# Patient Record
Sex: FEMALE | Race: WHITE | NOT HISPANIC OR LATINO | Employment: OTHER | URBAN - METROPOLITAN AREA
[De-identification: names, ages, dates, MRNs, and addresses within clinical notes are randomized per-mention and may not be internally consistent; named-entity substitution may affect disease eponyms.]

---

## 2022-10-25 ENCOUNTER — OFFICE VISIT (OUTPATIENT)
Dept: INTERNAL MEDICINE | Facility: CLINIC | Age: 77
End: 2022-10-25

## 2022-10-25 VITALS
BODY MASS INDEX: 29.82 KG/M2 | HEIGHT: 65 IN | WEIGHT: 179 LBS | OXYGEN SATURATION: 96 % | SYSTOLIC BLOOD PRESSURE: 124 MMHG | DIASTOLIC BLOOD PRESSURE: 70 MMHG | TEMPERATURE: 96.8 F | HEART RATE: 93 BPM

## 2022-10-25 DIAGNOSIS — M48.062 SPINAL STENOSIS OF LUMBAR REGION WITH NEUROGENIC CLAUDICATION: ICD-10-CM

## 2022-10-25 DIAGNOSIS — I10 HYPERTENSION, UNSPECIFIED TYPE: ICD-10-CM

## 2022-10-25 DIAGNOSIS — E03.9 HYPOTHYROIDISM, UNSPECIFIED TYPE: ICD-10-CM

## 2022-10-25 DIAGNOSIS — G47.00 INSOMNIA, UNSPECIFIED TYPE: ICD-10-CM

## 2022-10-25 DIAGNOSIS — E78.5 HYPERLIPIDEMIA, UNSPECIFIED HYPERLIPIDEMIA TYPE: ICD-10-CM

## 2022-10-25 DIAGNOSIS — Z00.00 ANNUAL PHYSICAL EXAM: Primary | ICD-10-CM

## 2022-10-25 PROBLEM — M65.9 FLEXOR CARPI RADIALIS TENOSYNOVITIS: Status: ACTIVE | Noted: 2021-06-21

## 2022-10-25 PROBLEM — M65.939 FLEXOR CARPI RADIALIS TENOSYNOVITIS: Status: ACTIVE | Noted: 2021-06-21

## 2022-10-25 PROCEDURE — 99204 OFFICE O/P NEW MOD 45 MIN: CPT | Performed by: STUDENT IN AN ORGANIZED HEALTH CARE EDUCATION/TRAINING PROGRAM

## 2022-10-25 RX ORDER — CYCLOBENZAPRINE HCL 10 MG
TABLET ORAL
COMMUNITY
Start: 2022-10-01 | End: 2022-12-12

## 2022-10-25 RX ORDER — LOSARTAN POTASSIUM 50 MG/1
100 TABLET ORAL DAILY
COMMUNITY
Start: 2022-10-03 | End: 2023-01-12 | Stop reason: ALTCHOICE

## 2022-10-25 RX ORDER — GABAPENTIN 300 MG/1
CAPSULE ORAL
COMMUNITY
Start: 2022-10-04 | End: 2022-12-12

## 2022-10-25 RX ORDER — DONEPEZIL HYDROCHLORIDE 5 MG/1
TABLET, FILM COATED ORAL
COMMUNITY
Start: 2022-10-03 | End: 2022-12-12

## 2022-10-25 RX ORDER — MELOXICAM 15 MG/1
15 TABLET ORAL DAILY
COMMUNITY
Start: 2022-09-24 | End: 2023-01-18 | Stop reason: SDUPTHER

## 2022-10-25 RX ORDER — SIMVASTATIN 40 MG
40 TABLET ORAL NIGHTLY
COMMUNITY
Start: 2022-09-30 | End: 2023-03-02 | Stop reason: SDUPTHER

## 2022-10-25 RX ORDER — LEVOTHYROXINE SODIUM 0.05 MG/1
50 TABLET ORAL DAILY
COMMUNITY
Start: 2022-10-22 | End: 2023-03-02 | Stop reason: SDUPTHER

## 2022-10-25 RX ORDER — METFORMIN HYDROCHLORIDE 750 MG/1
750 TABLET, EXTENDED RELEASE ORAL
COMMUNITY
Start: 2022-09-29 | End: 2023-01-17 | Stop reason: SDUPTHER

## 2022-10-25 RX ORDER — PANTOPRAZOLE SODIUM 40 MG/1
40 TABLET, DELAYED RELEASE ORAL DAILY
COMMUNITY
Start: 2022-09-30 | End: 2023-03-02 | Stop reason: SDUPTHER

## 2022-10-25 NOTE — PROGRESS NOTES
Venkata Enamorado M.D.  Internal Medicine  [facility]  [address], [address]  [place] [address]  [phone]      Chief Complaint  Establish Care, spinal stenosis  (Starting to effect her balance /), and Insomnia (Having issues sleeping )    SUBJECTIVE    History of Present Illness    Sneha Cook is a 77 y.o. female who presents to the office today as a new patient to establish care.     She previously saw Dr. Blakely      Spinal stenosis-she had a fall last week. She has an appointment with neurology Dr. Perez on December 14th. She has tingling and numbnbess in toes and fore meet. No weakness. She has back pain radiating to feet. It makes it hard to move in the AM. Did PT for 2 months. Was on meloxiccam and gabapentin. She felt PT helped. This is interfering with sleep. Uses heating pad at night and ice during the day. Also taking tylenol PM. She wants to avoid surgery. She fell on bathroom floor and had bruises on her side. Her legs were numb when she woke up so she lost balance. She is concerned about upcoming trip to [place] because she puts head and feet up on bed. Back pain gets better throughout day. She has stiffness in the morning. Epidural in past helped somewhat. No excessive worry.     States she had an abnormal EMG.     She takes meloxicam twice day.     She has diabetes on metformin. She is trying to control with diet.     HTN-on losartan. Usually controlled but had reading of 174 at pain medine apppointment.     HLD-on simvastin.     Hypothyroidism-s/p throidectomy. No cancer history.     Insomnia-for three months. She can fall asleep but wakes up at 12 and cannot go back to sleep. She watches TV when she can't fall asleep.     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 Lives with her son and walks daily    Review of Systems    No Known Allergies     Outpatient Medications Marked as Taking for the 10/25/22 encounter (Office Visit) with Venkata Enamorado MD  "  Medication Sig Dispense Refill   • cyclobenzaprine (FLEXERIL) 10 MG tablet      • donepezil (ARICEPT) 5 MG tablet      • gabapentin (NEURONTIN) 300 MG capsule      • levothyroxine (SYNTHROID, LEVOTHROID) 50 MCG tablet      • losartan (COZAAR) 50 MG tablet      • meloxicam (MOBIC) 15 MG tablet      • metFORMIN ER (GLUCOPHAGE-XR) 750 MG 24 hr tablet      • pantoprazole (PROTONIX) 40 MG EC tablet      • simvastatin (ZOCOR) 40 MG tablet           Past Medical History:   Diagnosis Date   • Diabetes mellitus (HCC)    • Hypothyroidism      Past Surgical History:   Procedure Laterality Date   • ADENOIDECTOMY     •  SECTION       Family History   Problem Relation Age of Onset   • Diabetes Father    • Diabetes Son    • No Known Problems Paternal Uncle     reports that she quit smoking about 52 years ago. Her smoking use included cigarettes. She has never used smokeless tobacco. She reports that she does not currently use alcohol. She reports that she does not use drugs.    OBJECTIVE    Vital Signs:   /70   Pulse 93   Temp 96.8 °F (36 °C)   Ht 165.1 cm (65\")   Wt 81.2 kg (179 lb)   SpO2 96%   BMI 29.79 kg/m²     Physical Exam  Constitutional:       Appearance: Normal appearance. She is normal weight.   Cardiovascular:      Rate and Rhythm: Normal rate and regular rhythm.      Heart sounds: Normal heart sounds. No murmur heard.  Pulmonary:      Effort: Pulmonary effort is normal.      Breath sounds: Normal breath sounds.   Abdominal:      General: Abdomen is flat. There is no distension.      Palpations: Abdomen is soft.      Tenderness: There is no abdominal tenderness.   Musculoskeletal:      Lumbar back: No edema or bony tenderness. Decreased range of motion. Positive left straight leg raise test. Negative right straight leg raise test.   Skin:     General: Skin is warm and dry.   Neurological:      Mental Status: She is alert.      Deep Tendon Reflexes: Reflexes normal.   Psychiatric:         Mood and " Affect: Mood normal.         Behavior: Behavior normal.         Thought Content: Thought content normal.            The following data was reviewed by: Venkata Enamorado MD on 10/25/2022:                Data reviewed: Note from Dr. Blakely's office           IMPRESSION:   1. Transitional spinal anatomy and degenerative changes of the lumbar spine, detailed above. If a spinal procedure is to be performed, careful correlation for exact spinal level is recommended.     2. At L4-L5, retrolisthesis, severe degenerative disc disease, and severe facet arthropathy contribute to severe spinal stenosis, severe bilateral lateral recess stenosis, flattening of the L5 nerve roots, and right greater than left neural foraminal   narrowing     3. At L3-L4, retrolisthesis, severe degenerative disc disease, and facet arthropathy contribute to moderate spinal stenosis and moderate bilateral neural foraminal narrowing,     4. At L2-L3, retrolisthesis and mild to moderate degenerative disc disease contribute to mild to moderate central canal stenosis and moderate bilateral neural foraminal narrowing        ASSESSMENT & PLAN     Diagnoses and all orders for this visit:    1. Annual physical exam (Primary)  -     Ambulatory Referral to Physical Therapy  -     Hemoglobin A1c; Future  -     Comprehensive Metabolic Panel; Future  -     CBC & Differential; Future  -     Lipid Panel With LDL / HDL Ratio; Future  -     TSH Rfx On Abnormal To Free T4; Future    2. Spinal stenosis of lumbar region with neurogenic claudication  Comments:  We will follow-up after neurology appointment at [facility].  Patient to continue current management.  We will refer to PT as this helped in the past.    3. Hypertension, unspecified type  Comments:  Continue current management    4. Hypothyroidism, unspecified type  Comments:  Checking TSH    5. Hyperlipidemia, unspecified hyperlipidemia type  Comments:  Checking lipid panel    6. Insomnia, unspecified  type  Comments:  Discussed sleep hygiene today.  Discouraged patient from watching TV when she cannot sleep.          Health Maintenance Due   Topic Date Due   • DXA SCAN  Never done   • ANNUAL PHYSICAL  Never done   • TDAP/TD VACCINES (1 - Tdap) Never done   • ZOSTER VACCINE (1 of 2) Never done   • Pneumococcal Vaccine 65+ (1 - PCV) Never done   • COVID-19 Vaccine (4 - Booster for Pfizer series) 12/23/2021   • INFLUENZA VACCINE  Never done   • HEPATITIS C SCREENING  Never done   • LIPID PANEL  10/26/2022        Follow Up  Return in about 2 months (around 12/25/2022) for between December 14th and December 23rd and lab appointment at patients convenience. .    Patient/family had no further questions at this time and verbalized understanding of the plan discussed today.

## 2022-10-26 ENCOUNTER — PATIENT ROUNDING (BHMG ONLY) (OUTPATIENT)
Dept: INTERNAL MEDICINE | Facility: CLINIC | Age: 77
End: 2022-10-26

## 2022-10-26 PROBLEM — I10 HYPERTENSION: Status: ACTIVE | Noted: 2022-10-26

## 2022-10-26 PROBLEM — M48.062 SPINAL STENOSIS OF LUMBAR REGION WITH NEUROGENIC CLAUDICATION: Status: ACTIVE | Noted: 2022-10-26

## 2022-10-26 PROBLEM — G47.00 INSOMNIA: Status: ACTIVE | Noted: 2022-10-26

## 2022-10-26 PROBLEM — E03.9 HYPOTHYROIDISM: Status: ACTIVE | Noted: 2022-10-26

## 2022-10-26 PROBLEM — E78.5 HYPERLIPIDEMIA: Status: ACTIVE | Noted: 2022-10-26

## 2022-10-26 NOTE — PROGRESS NOTES
October 26, 2022    I am  with [facility]  [address] [address] [phone].      Information collected by Bree at check out    Tell me about your visit with us. What things went well?  Everyone was efficient and courteous.  Medical assistant was pleasant and took my blood pressure and went over my medications       We're always looking for ways to make our patients' experiences even better. Do you have recommendations on ways we may improve?  NO    Overall were you satisfied with your first visit to our practice? YES      I appreciate you taking the time to speak with me today. Is there anything else I can do for you?  NO      Thank you, and have a great day.

## 2022-12-12 ENCOUNTER — OFFICE VISIT (OUTPATIENT)
Dept: FAMILY MEDICINE CLINIC | Facility: CLINIC | Age: 77
End: 2022-12-12

## 2022-12-12 VITALS
TEMPERATURE: 97.7 F | OXYGEN SATURATION: 99 % | RESPIRATION RATE: 16 BRPM | SYSTOLIC BLOOD PRESSURE: 136 MMHG | HEIGHT: 64 IN | WEIGHT: 180 LBS | HEART RATE: 63 BPM | BODY MASS INDEX: 30.73 KG/M2 | DIASTOLIC BLOOD PRESSURE: 82 MMHG

## 2022-12-12 DIAGNOSIS — M48.062 SPINAL STENOSIS OF LUMBAR REGION WITH NEUROGENIC CLAUDICATION: ICD-10-CM

## 2022-12-12 DIAGNOSIS — E89.0 POSTOPERATIVE HYPOTHYROIDISM: Primary | ICD-10-CM

## 2022-12-12 DIAGNOSIS — I10 PRIMARY HYPERTENSION: ICD-10-CM

## 2022-12-12 DIAGNOSIS — E78.5 DYSLIPIDEMIA: ICD-10-CM

## 2022-12-12 PROCEDURE — 99214 OFFICE O/P EST MOD 30 MIN: CPT | Performed by: FAMILY MEDICINE

## 2022-12-12 RX ORDER — CHLORAL HYDRATE 500 MG
CAPSULE ORAL
COMMUNITY

## 2022-12-12 NOTE — PROGRESS NOTES
Subjective     Sneha Cook is a 77 y.o. female.     Chief Complaint   Patient presents with   • Hypertension   • Hyperlipidemia   • Hypothyroidism   • Establish Care   • Numbness     10 toes are numb pain wakes her up at night.   • Diabetes     Had A1c done less than 3 months ago it was 6.5 with Nena Blakely.       History of Present Illness      To establish care, discuss the followings ;    Chronic back pain with Spinal stenosis. She has tingling and numbnbess in toes . No weakness.   She has back pain radiating to feet.   Epidural in past helped somewhat.   Pain worse with movement   Walks every day without issue   All toes are numb and cold at night   Was on Kathy, stopped taking it due to S/E   Had PT , helps some , was on meloxiccam and gabapentin, no longer on kathy due no s/e .  She concerned about upcoming trip to [place].  She has an appointment with neurology Dr. Perez on December 14th.     HTN  -This is a chronic problem.   - on HD  -associated signs and symptoms: none  -medication compliance: on losartan,  taking as prescribed, No S/E reported from current Rx   -Denies  blurred vision, chest pain, neck pain, orthopnea or shortness of breath.       Lab Results   Component Value Date    BUN 15 07/16/2020    CREATININE 0.8 07/16/2020    BCR 18.8 07/16/2020    K 4.8 07/16/2020    CO2 30 07/16/2020    CALCIUM 9.5 07/16/2020    ALBUMIN 4.2 07/16/2020    LABIL2 1.6 07/16/2020    AST 27 07/16/2020    ALT 19 07/16/2020         Hypothyroidism s/p throidectomy  This is a chronic problem. Nothing aggravates the symptoms.   Doing well on Levothyr 50 mcg.      Lab Results   Component Value Date    TSH 0.654 07/16/2020     GERD;  well controlled with current Rx, no S/E reported.     DM type 2 ; On HD low in carbs, doing well on MTF  Not smoker   Dose not drinks alcohol  Lab Results   Component Value Date    HGBA1C 7.0 (H) 07/16/2020          Hyperlipidemia  This is a chronic problem. Last lipid tests were reviewed  . Had recent labs done 3 weeks ago   Eating healthy   Pertinent negatives include no chest pain or shortness of breath.   Current antihyperlipidemic treatment includes statins. No S/E reported    Risk factors for coronary artery disease include dyslipidemia.     Lab Results   Component Value Date    CHLPL 182 07/16/2020    TRIG 108 07/16/2020    HDL 83 07/16/2020    LDL 77 07/16/2020     Labs and notes from previous PCP has been reviewed     The following portions of the patient's history were reviewed and updated as appropriate: allergies, current medications, past family history, past medical history, past social history, past surgical history and problem list.        Review of Systems   Cardiovascular: Negative for chest pain, palpitations and leg swelling.   Endocrine: Negative for cold intolerance and heat intolerance.   Musculoskeletal: Positive for back pain. Negative for gait problem.       Vitals:    12/12/22 1558   BP: 136/82   Pulse: 63   Resp: 16   Temp: 97.7 °F (36.5 °C)   SpO2: 99%           12/12/22  1558   Weight: 81.6 kg (180 lb)         Body mass index is 30.9 kg/m².      Current Outpatient Medications   Medication Sig Dispense Refill   • levothyroxine (SYNTHROID, LEVOTHROID) 50 MCG tablet Take 50 mcg by mouth Daily.     • losartan (COZAAR) 50 MG tablet Take 100 mg by mouth Daily.     • meloxicam (MOBIC) 15 MG tablet Take 15 mg by mouth Daily.     • metFORMIN ER (GLUCOPHAGE-XR) 750 MG 24 hr tablet Take 750 mg by mouth Daily With Breakfast.     • Omega-3 Fatty Acids (fish oil) 1000 MG capsule capsule Take  by mouth Daily With Breakfast.     • pantoprazole (PROTONIX) 40 MG EC tablet Take 40 mg by mouth Daily.     • simvastatin (ZOCOR) 40 MG tablet Take 40 mg by mouth Every Night.       No current facility-administered medications for this visit.                Objective   Physical Exam  Vitals and nursing note reviewed.   Constitutional:       General: She is not in acute distress.     Appearance:  She is not ill-appearing, toxic-appearing or diaphoretic.   HENT:      Mouth/Throat:      Mouth: Mucous membranes are moist.   Eyes:      Conjunctiva/sclera: Conjunctivae normal.   Neck:      Comments: No enlarged thyroid    Cardiovascular:      Rate and Rhythm: Normal rate and regular rhythm.      Heart sounds: Normal heart sounds. No murmur heard.  Pulmonary:      Effort: Pulmonary effort is normal. No respiratory distress.      Breath sounds: Normal breath sounds. No stridor. No wheezing, rhonchi or rales.   Musculoskeletal:         General: No swelling or tenderness.      Cervical back: Neck supple.      Right lower leg: No edema.      Left lower leg: No edema.   Skin:     General: Skin is warm.   Neurological:      General: No focal deficit present.      Mental Status: She is alert and oriented to person, place, and time.      Gait: Gait normal.      Deep Tendon Reflexes: Reflexes normal.   Psychiatric:         Mood and Affect: Mood normal.         Behavior: Behavior normal.         Thought Content: Thought content normal.           Assessment & Plan   Diagnoses and all orders for this visit:    1. Postoperative hypothyroidism (Primary)  - Stable, continue current Rx    2. Dyslipidemia  - Stable, continue current Rx  Encouraged patient to maintain a low cholesterol/DASH diet, increase aerobic exercise as tolerated, decrease alcohol, stop smoking if applicable, increase fiber intake, limit sodium intake to no more than 1,500mg/day, increase omega-3 fatty acids, and maintain medication compliance.     3. Spinal stenosis of lumbar region with neurogenic claudication  - Stable, continue current Rx    4. Primary hypertension  Comments:  Continue current management  Encouraged patient to maintain a heart healthy diet/DASH diet, exercise as tolerated, limit sodium intake to no more than 1,500mg/day, limit alcohol intake, maintain medication compliance and practice relaxation techniques to reduce stress.     Patient  was given instructions and counseling regarding her condition or for health maintenance advice.   Please see specific information pulled into the AVS if appropriate.   Medical assistant and I wore mask and eyewear protection during entire encounter.    Patient wore mask.         I have fully discussed the nature of the medical condition(s) risks, complications, management, safe and proper use of medications.   She stated no allergy to the above prescribed medication.  I have discussed the SIDE EFFECT OF MEDICATION and importance TO report any side effect , the patient expressed good understanding.  Encouraged medication compliance and the importance of keeping scheduled follow up appointments with me and any other providers.    Patient instructed to follow up with our office for results on any labs/imaging ordered during this visit.    Home care discussed  All questions answered  Patient verbalizes understanding and agrees to treatment plan.     Follow up: Return in about 3 months (around 3/15/2023) for medicare wellness, come fasting.

## 2022-12-15 ENCOUNTER — TELEPHONE (OUTPATIENT)
Dept: INTERNAL MEDICINE | Facility: CLINIC | Age: 77
End: 2022-12-15

## 2022-12-15 DIAGNOSIS — M54.10 RADICULAR LOW BACK PAIN: ICD-10-CM

## 2022-12-15 DIAGNOSIS — M48.062 SPINAL STENOSIS OF LUMBAR REGION WITH NEUROGENIC CLAUDICATION: Primary | ICD-10-CM

## 2022-12-15 NOTE — TELEPHONE ENCOUNTER
Caller: Sneha Cook    Relationship: Self    Best call back number: [phone]    What is the medical concern/diagnosis: SPINAL STENOSIS     What specialty or service is being requested: NEUROSURGERY     What is the provider, practice or medical service name: DR. CHATO AHYDEN    What is the office location:     [address] [place] [address]  [address] [place] [address]    What is the office phone number: [phone]    FAX: [phone]    Any additional details: REQUESTED A REFERRAL TO NEUROSURGERY, SHE SAID SHE SAW NEUROLOGY YESTERDAY AND THEY SUGGESTED SHE SEE A NEUROSURGEON

## 2022-12-15 NOTE — TELEPHONE ENCOUNTER
Caller: Sneha Cook    Relationship: Self    Best call back number: [phone]    What is the best time to reach you: ANYTIME    Who are you requesting to speak with (clinical staff, provider,  specific staff member): CLINICAL STAFF    Do you know the name of the person who called: PATIENT     What was the call regarding: PATIENT IS WANTING TO SEE IF DR. GUAMAN RECEIVED THE MEDICAL RECORDS FROM DR. FRANCOIS FOSTER     SHE SAID SHE HAD SEVERAL TEST RESULTS AND RECORDS   WITH THAT PROVIDER AND WANTED TO MAKE SURE DR. GUAMAN HAD THAT INFORMATION     Do you require a callback: YES

## 2022-12-16 DIAGNOSIS — M48.062 SPINAL STENOSIS OF LUMBAR REGION WITH NEUROGENIC CLAUDICATION: Primary | ICD-10-CM

## 2022-12-16 DIAGNOSIS — M54.10 RADICULAR LOW BACK PAIN: ICD-10-CM

## 2022-12-16 NOTE — TELEPHONE ENCOUNTER
Patient will come to office and fill out Disclosure of health info and  copy of referral and drop off copy of medication's today.

## 2022-12-21 ENCOUNTER — TELEPHONE (OUTPATIENT)
Dept: FAMILY MEDICINE CLINIC | Facility: CLINIC | Age: 77
End: 2022-12-21

## 2022-12-27 ENCOUNTER — TELEPHONE (OUTPATIENT)
Dept: FAMILY MEDICINE CLINIC | Facility: CLINIC | Age: 77
End: 2022-12-27

## 2022-12-27 DIAGNOSIS — R20.2 PARESTHESIA: ICD-10-CM

## 2022-12-27 DIAGNOSIS — M48.062 SPINAL STENOSIS OF LUMBAR REGION WITH NEUROGENIC CLAUDICATION: Primary | ICD-10-CM

## 2023-01-12 ENCOUNTER — TELEPHONE (OUTPATIENT)
Dept: FAMILY MEDICINE CLINIC | Facility: CLINIC | Age: 78
End: 2023-01-12

## 2023-01-12 ENCOUNTER — TELEMEDICINE (OUTPATIENT)
Dept: FAMILY MEDICINE CLINIC | Facility: CLINIC | Age: 78
End: 2023-01-12
Payer: MEDICARE

## 2023-01-12 DIAGNOSIS — E11.9 TYPE 2 DIABETES MELLITUS WITHOUT COMPLICATION, WITHOUT LONG-TERM CURRENT USE OF INSULIN: ICD-10-CM

## 2023-01-12 DIAGNOSIS — M54.10 RADICULAR LOW BACK PAIN: Primary | ICD-10-CM

## 2023-01-12 PROCEDURE — 99213 OFFICE O/P EST LOW 20 MIN: CPT | Performed by: FAMILY MEDICINE

## 2023-01-12 RX ORDER — LOSARTAN POTASSIUM 100 MG/1
100 TABLET ORAL DAILY
COMMUNITY
Start: 2022-12-14 | End: 2023-03-02 | Stop reason: SDUPTHER

## 2023-01-12 NOTE — PROGRESS NOTES
Sneha Cook is a 77 y.o. female.     Chief Complaint   Patient presents with   • medication review     Question about medication, can she take metformin with detox        HPI     Pt is a pleasant 77 y.o. YO female here for check medication interference .  Started detox today per Chiropractor recommendation just for 3 days as she has back and joints pain.  Today,is D1--> Not eating any things for 3 days ,No food, just fluids   Asking if she can hold on MTF   Last A1c was 6.2       Lab Results   Component Value Date    HGBA1C 7.0 (H) 07/16/2020             The following portions of the patient's history were reviewed and updated as appropriate: allergies, current medications, past family history, past medical history, past social history, past surgical history and problem list.    Review of Systems   Musculoskeletal: Positive for arthralgias and back pain.       Objective  There were no vitals filed for this visit.     Physical Exam  Constitutional:       General: She is not in acute distress.     Appearance: She is not ill-appearing, toxic-appearing or diaphoretic.   Neurological:      Mental Status: She is alert and oriented to person, place, and time.   Psychiatric:         Mood and Affect: Mood normal.         Behavior: Behavior normal.         Thought Content: Thought content normal.           Current Outpatient Medications:   •  levothyroxine (SYNTHROID, LEVOTHROID) 50 MCG tablet, Take 50 mcg by mouth Daily., Disp: , Rfl:   •  meloxicam (MOBIC) 15 MG tablet, Take 15 mg by mouth Daily., Disp: , Rfl:   •  metFORMIN ER (GLUCOPHAGE-XR) 750 MG 24 hr tablet, Take 750 mg by mouth Daily With Breakfast., Disp: , Rfl:   •  Omega-3 Fatty Acids (fish oil) 1000 MG capsule capsule, Take  by mouth Daily With Breakfast., Disp: , Rfl:   •  pantoprazole (PROTONIX) 40 MG EC tablet, Take 40 mg by mouth Daily., Disp: , Rfl:   •  simvastatin (ZOCOR) 40 MG tablet, Take 40 mg by mouth Every Night., Disp: , Rfl:   •   losartan (COZAAR) 100 MG tablet, , Disp: , Rfl:     Procedures    Lab Results (most recent)     None              Diagnoses and all orders for this visit:    1. Radicular low back pain (Primary);  - Following with Chiropractor, doing Detox  - Today D1 --> no food, only fluids     2. Type 2 diabetes mellitus without complication, without long-term current use of insulin (HCC)  - As her last a1c was ok 6.2, ok to hold MTF for 3 days       Return for if no better or worsening symptoms.      Felix Barone MD    This was an audio and video enabled telemedicine encounter secondary to CDC recommendations and patient safety with COVID19 Pandemic.     Patient location ; home   Provider location ; office

## 2023-01-17 RX ORDER — METFORMIN HYDROCHLORIDE 750 MG/1
750 TABLET, EXTENDED RELEASE ORAL
Qty: 90 TABLET | Refills: 1 | Status: SHIPPED | OUTPATIENT
Start: 2023-01-17 | End: 2023-02-16 | Stop reason: SDUPTHER

## 2023-01-17 NOTE — TELEPHONE ENCOUNTER
PATIENT REQUESTING REFILLS. 90 DAY SUPPLY     MELOXICAM - DOESN'T KNOW MG.   METFORMIN ER   CYCLOBENZAPRINE 10MG     [facility] [id number] [facility] [address] AT [facility] - [phone]  - [phone]   [phone]

## 2023-01-17 NOTE — TELEPHONE ENCOUNTER
HAS NOT DONE ACTIVE LAB ORDERS SINCE OCTOBER  Rx Refill Note  Requested Prescriptions     Pending Prescriptions Disp Refills   • metFORMIN ER (GLUCOPHAGE-XR) 750 MG 24 hr tablet       Sig: Take 1 tablet by mouth Daily With Breakfast.      Last office visit with prescribing clinician: 12/12/2022   Last telemedicine visit with prescribing clinician: Visit date not found   Next office visit with prescribing clinician: Visit date not found                         Would you like a call back once the refill request has been completed: [] Yes [] No    If the office needs to give you a call back, can they leave a voicemail: [] Yes [] No    Darrin Lay MA  01/17/23, 13:11 EST

## 2023-01-18 ENCOUNTER — TELEPHONE (OUTPATIENT)
Dept: FAMILY MEDICINE CLINIC | Facility: CLINIC | Age: 78
End: 2023-01-18
Payer: MEDICARE

## 2023-01-18 DIAGNOSIS — M48.062 SPINAL STENOSIS OF LUMBAR REGION WITH NEUROGENIC CLAUDICATION: ICD-10-CM

## 2023-01-18 RX ORDER — MELOXICAM 15 MG/1
15 TABLET ORAL DAILY
Status: CANCELLED | OUTPATIENT
Start: 2023-01-18

## 2023-01-18 RX ORDER — MELOXICAM 15 MG/1
15 TABLET ORAL DAILY
Qty: 90 TABLET | Refills: 0 | Status: SHIPPED | OUTPATIENT
Start: 2023-01-18 | End: 2023-01-19 | Stop reason: SDUPTHER

## 2023-01-18 NOTE — TELEPHONE ENCOUNTER
Spoke with patient, she is only needing a 90 day refill on the meloxicam. She is aware the cyclobenzaprine was prescribed by another physician. She is currently out. Is it possible to send script to the [facility] pharmacy on file. It would be convenient for patient, so she can  both prescriptions (the metformin already prescribed) at the same time.

## 2023-01-19 DIAGNOSIS — M48.062 SPINAL STENOSIS OF LUMBAR REGION WITH NEUROGENIC CLAUDICATION: ICD-10-CM

## 2023-01-19 RX ORDER — MELOXICAM 15 MG/1
15 TABLET ORAL DAILY
Qty: 90 TABLET | Refills: 0 | Status: SHIPPED | OUTPATIENT
Start: 2023-01-19 | End: 2023-02-16 | Stop reason: SDUPTHER

## 2023-01-19 NOTE — TELEPHONE ENCOUNTER
Can clinical cancel meloxicam Rx with [facility] and send to [facility]? Patients Metformin is ready for  at [facility], patient would like to  both medications at once.

## 2023-02-16 ENCOUNTER — OFFICE VISIT (OUTPATIENT)
Dept: FAMILY MEDICINE CLINIC | Facility: CLINIC | Age: 78
End: 2023-02-16
Payer: MEDICARE

## 2023-02-16 VITALS
RESPIRATION RATE: 16 BRPM | TEMPERATURE: 96.9 F | OXYGEN SATURATION: 97 % | SYSTOLIC BLOOD PRESSURE: 122 MMHG | BODY MASS INDEX: 29.16 KG/M2 | WEIGHT: 175 LBS | HEART RATE: 99 BPM | DIASTOLIC BLOOD PRESSURE: 84 MMHG | HEIGHT: 65 IN

## 2023-02-16 DIAGNOSIS — E11.9 TYPE 2 DIABETES MELLITUS WITHOUT COMPLICATION, WITHOUT LONG-TERM CURRENT USE OF INSULIN: Primary | ICD-10-CM

## 2023-02-16 DIAGNOSIS — E11.9 DM TYPE 2, GOAL HBA1C < 7%: ICD-10-CM

## 2023-02-16 DIAGNOSIS — R20.0 NUMBNESS AND TINGLING OF FOOT: ICD-10-CM

## 2023-02-16 DIAGNOSIS — K21.9 CHRONIC GERD: ICD-10-CM

## 2023-02-16 DIAGNOSIS — L03.031 CELLULITIS OF TOE OF RIGHT FOOT: ICD-10-CM

## 2023-02-16 DIAGNOSIS — I10 PRIMARY HYPERTENSION: ICD-10-CM

## 2023-02-16 DIAGNOSIS — R20.2 NUMBNESS AND TINGLING OF FOOT: ICD-10-CM

## 2023-02-16 DIAGNOSIS — M48.062 SPINAL STENOSIS OF LUMBAR REGION WITH NEUROGENIC CLAUDICATION: ICD-10-CM

## 2023-02-16 DIAGNOSIS — E03.9 ACQUIRED HYPOTHYROIDISM: ICD-10-CM

## 2023-02-16 DIAGNOSIS — E78.5 DYSLIPIDEMIA: ICD-10-CM

## 2023-02-16 LAB
EXPIRATION DATE: ABNORMAL
HBA1C MFR BLD: 6.9 %
Lab: ABNORMAL

## 2023-02-16 PROCEDURE — 99214 OFFICE O/P EST MOD 30 MIN: CPT | Performed by: FAMILY MEDICINE

## 2023-02-16 PROCEDURE — 36416 COLLJ CAPILLARY BLOOD SPEC: CPT | Performed by: FAMILY MEDICINE

## 2023-02-16 PROCEDURE — 3044F HG A1C LEVEL LT 7.0%: CPT | Performed by: FAMILY MEDICINE

## 2023-02-16 PROCEDURE — 83036 HEMOGLOBIN GLYCOSYLATED A1C: CPT | Performed by: FAMILY MEDICINE

## 2023-02-16 RX ORDER — METFORMIN HYDROCHLORIDE 750 MG/1
750 TABLET, EXTENDED RELEASE ORAL
Qty: 90 TABLET | Refills: 1 | Status: SHIPPED | OUTPATIENT
Start: 2023-02-16 | End: 2023-03-02 | Stop reason: SDUPTHER

## 2023-02-16 RX ORDER — METFORMIN HYDROCHLORIDE 750 MG/1
750 TABLET, EXTENDED RELEASE ORAL
Qty: 90 TABLET | Refills: 1 | Status: CANCELLED | OUTPATIENT
Start: 2023-02-16

## 2023-02-16 RX ORDER — MELOXICAM 15 MG/1
15 TABLET ORAL DAILY
Qty: 90 TABLET | Refills: 0 | Status: CANCELLED | OUTPATIENT
Start: 2023-02-16

## 2023-02-16 RX ORDER — MELOXICAM 15 MG/1
15 TABLET ORAL DAILY
Qty: 90 TABLET | Refills: 0 | Status: SHIPPED | OUTPATIENT
Start: 2023-02-16 | End: 2023-03-02 | Stop reason: SDUPTHER

## 2023-02-16 RX ORDER — AMOXICILLIN AND CLAVULANATE POTASSIUM 875; 125 MG/1; MG/1
1 TABLET, FILM COATED ORAL 2 TIMES DAILY
Qty: 20 TABLET | Refills: 0 | Status: SHIPPED | OUTPATIENT
Start: 2023-02-16 | End: 2023-03-28

## 2023-02-16 NOTE — PROGRESS NOTES
Answers for HPI/ROS submitted by the patient on 2/16/2023  Please describe your symptoms.: I have neuropathy in both of my feet my right foot third toe has a black toenail and now the toe is turning red and painful., I am a type two diabetic.  Have you had these symptoms before?: No  How long have you been having these symptoms?: 1-4 days  Please list any medications you are currently taking for this condition.: 0  Please describe any probable cause for these symptoms. : I have a diagnosis of neuropathy in both of my feet. I have limited feelings; i.e. in  my toes., , This condition is due to diagnosed spinal stenosis; I have lost 2 discs;  L5 S1 and an arthritic spine which may lead to further losses if untreated. I am receiving chiropractic & neuropathy care from Dr. Flavio Gupta,  What is the primary reason for your visit?: Other    Subjective     Sneha Cook is a 77 y.o. female.     Chief Complaint   Patient presents with   • Nail Problem     3rd Toe on right foot nail is black, red and inflamed, swollen. Notice Last night   • spinal stenosis   • Diabetes       History of Present Illness     Pt with Type 2 DM , well controlled with diet and MTF one pill/day.   She is c/o bilateral foot and toes numbness for many years secondary to chronic back pain and spinal stenosis with DDD. She is following with chiropractor , feels better her back pain improving but still has the foot numbness. She noticed redness of the Rt 3rd toe with pain since last night. She walks a lot with no h/o direct trauma.   Her A1c today is 6.9  HLD; nn HD low in carbs , doing well on statin.  She takes Mobic x 1 /day.   Hypothyroidism ; doing well on Levothyr 50 mcg. No recent TSH check   GERD ;  well controlled with current Rx, no S/E reported.   HTN; no new concern , BP stable with current Rx      Lab Results   Component Value Date    TSH 0.654 07/16/2020          The following portions of the patient's history were reviewed and  updated as appropriate: allergies, current medications, past family history, past medical history, past social history, past surgical history and problem list.        Review of Systems   Skin: Positive for color change.   Neurological: Positive for numbness.       Vitals:    02/16/23 1040   BP: 122/84   Pulse: 99   Resp: 16   Temp: 96.9 °F (36.1 °C)   SpO2: 97%           02/16/23  1040   Weight: 79.4 kg (175 lb)         Body mass index is 29.12 kg/m².      Current Outpatient Medications   Medication Sig Dispense Refill   • levothyroxine (SYNTHROID, LEVOTHROID) 50 MCG tablet Take 50 mcg by mouth Daily.     • losartan (COZAAR) 100 MG tablet Take 100 mg by mouth Daily.     • meloxicam (MOBIC) 15 MG tablet Take 1 tablet by mouth Daily. 90 tablet 0   • metFORMIN ER (GLUCOPHAGE-XR) 750 MG 24 hr tablet Take 1 tablet by mouth Daily With Breakfast. 90 tablet 1   • Omega-3 Fatty Acids (fish oil) 1000 MG capsule capsule Take  by mouth Daily With Breakfast.     • pantoprazole (PROTONIX) 40 MG EC tablet Take 40 mg by mouth Daily.     • simvastatin (ZOCOR) 40 MG tablet Take 40 mg by mouth Every Night.     • amoxicillin-clavulanate (Augmentin) 875-125 MG per tablet Take 1 tablet by mouth 2 (Two) Times a Day. 20 tablet 0     No current facility-administered medications for this visit.                Objective   Physical Exam  Vitals and nursing note reviewed.   Constitutional:       General: She is not in acute distress.     Appearance: She is not ill-appearing, toxic-appearing or diaphoretic.   HENT:      Mouth/Throat:      Mouth: Mucous membranes are moist.   Cardiovascular:      Rate and Rhythm: Normal rate.      Pulses:           Dorsalis pedis pulses are 1+ on the right side and 1+ on the left side.   Musculoskeletal:      Right foot: Normal range of motion. No deformity, bunion, Charcot foot or foot drop.      Left foot: Normal range of motion. No deformity, bunion, Charcot foot or foot drop.   Feet:      Right foot:       Protective Sensation: 5 sites tested. 2 sites sensed.      Skin integrity: Erythema present.      Toenail Condition: Right toenails are abnormally thick.      Left foot:      Protective Sensation: 5 sites tested. 1 site sensed.     Skin integrity: Skin integrity normal.      Toenail Condition: Left toenails are abnormally thick.   Skin:     General: Skin is warm.      Findings: Erythema present.      Comments: Redness of the Rt 3rd toe    Neurological:      Mental Status: She is alert and oriented to person, place, and time.   Psychiatric:         Mood and Affect: Mood normal.         Behavior: Behavior normal.         Thought Content: Thought content normal.         Judgment: Judgment normal.       Diabetic Foot Exam Performed         Assessment & Plan   Diagnoses and all orders for this visit:    1. Type 2 diabetes mellitus without complication, without long-term current use of insulin ([facility]) (Primary)  -     POC Glycosylated Hemoglobin (Hb A1C)  - Well controlled , continue diet and MTF    2. Spinal stenosis of lumbar region with neurogenic claudication;  - Advised to take Mobic only PRN  -     meloxicam (MOBIC) 15 MG tablet; Take 1 tablet by mouth Daily.  Dispense: 90 tablet; Refill: 0    3. DM type 2, goal HbA1c < 7% ([facility]);  - As above   -     metFORMIN ER (GLUCOPHAGE-XR) 750 MG 24 hr tablet; Take 1 tablet by mouth Daily With Breakfast.  Dispense: 90 tablet; Refill: 1  -     Ambulatory Referral to Podiatry    4. Cellulitis of toe of right foot;  - Will start on;  -     amoxicillin-clavulanate (Augmentin) 875-125 MG per tablet; Take 1 tablet by mouth 2 (Two) Times a Day.  Dispense: 20 tablet; Refill: 0  - Advised to closely monitor and check toe     5. Numbness and tingling of foot  -     Ambulatory Referral to Podiatry    6. Dyslipidemia  - Stable, continue current Rx  - Will do lab next OV    7. Primary hypertension  - Stable, continue current Rx    8. Acquired hypothyroidism  - Stable, continue current  Rx  - Will do lab next OV    9. Chronic GERD  - Stable, continue current Rx    Patient was given instructions and counseling regarding her condition or for health maintenance advice.   Please see specific information pulled into the AVS if appropriate.   Medical assistant and I wore mask and eyewear protection during entire encounter.    Patient wore mask.         I have fully discussed the nature of the medical condition(s) risks, complications, management, safe and proper use of medications.   She stated no allergy to the above prescribed medication.  I have discussed the SIDE EFFECT OF MEDICATION and importance TO report any side effect , the patient expressed good understanding.  Encouraged medication compliance and the importance of keeping scheduled follow up appointments with me and any other providers.    Patient instructed to follow up with our office for results on any labs/imaging ordered during this visit.    Home care discussed  All questions answered  Patient verbalizes understanding and agrees to treatment plan.     Follow up: Return in about 1 week (around 2/23/2023) for follow up on current illness.

## 2023-02-23 ENCOUNTER — OFFICE VISIT (OUTPATIENT)
Dept: FAMILY MEDICINE CLINIC | Facility: CLINIC | Age: 78
End: 2023-02-23
Payer: MEDICARE

## 2023-02-23 VITALS
BODY MASS INDEX: 29.16 KG/M2 | SYSTOLIC BLOOD PRESSURE: 132 MMHG | OXYGEN SATURATION: 99 % | HEART RATE: 79 BPM | HEIGHT: 65 IN | RESPIRATION RATE: 16 BRPM | TEMPERATURE: 97.5 F | DIASTOLIC BLOOD PRESSURE: 80 MMHG | WEIGHT: 175 LBS

## 2023-02-23 DIAGNOSIS — M54.10 RADICULAR LOW BACK PAIN: ICD-10-CM

## 2023-02-23 DIAGNOSIS — I10 PRIMARY HYPERTENSION: ICD-10-CM

## 2023-02-23 DIAGNOSIS — E03.9 ACQUIRED HYPOTHYROIDISM: ICD-10-CM

## 2023-02-23 DIAGNOSIS — K21.9 CHRONIC GERD: ICD-10-CM

## 2023-02-23 DIAGNOSIS — L03.031 CELLULITIS OF TOE OF RIGHT FOOT: Primary | ICD-10-CM

## 2023-02-23 DIAGNOSIS — E78.5 DYSLIPIDEMIA: ICD-10-CM

## 2023-02-23 LAB
ALBUMIN SERPL-MCNC: 4.4 G/DL (ref 3.5–5.2)
ALBUMIN/GLOB SERPL: 2.1 G/DL
ALP SERPL-CCNC: 94 U/L (ref 39–117)
ALT SERPL-CCNC: 16 U/L (ref 1–33)
AST SERPL-CCNC: 23 U/L (ref 1–32)
BILIRUB SERPL-MCNC: 0.3 MG/DL (ref 0–1.2)
BUN SERPL-MCNC: 25 MG/DL (ref 8–23)
BUN/CREAT SERPL: 25.5 (ref 7–25)
CALCIUM SERPL-MCNC: 9.7 MG/DL (ref 8.6–10.5)
CHLORIDE SERPL-SCNC: 96 MMOL/L (ref 98–107)
CHOLEST SERPL-MCNC: 177 MG/DL (ref 0–200)
CO2 SERPL-SCNC: 30.8 MMOL/L (ref 22–29)
CREAT SERPL-MCNC: 0.98 MG/DL (ref 0.57–1)
EGFRCR SERPLBLD CKD-EPI 2021: 59.2 ML/MIN/1.73
GLOBULIN SER CALC-MCNC: 2.1 GM/DL
GLUCOSE SERPL-MCNC: 136 MG/DL (ref 65–99)
HDLC SERPL-MCNC: 90 MG/DL (ref 40–60)
LDLC SERPL CALC-MCNC: 72 MG/DL (ref 0–100)
POTASSIUM SERPL-SCNC: 4.7 MMOL/L (ref 3.5–5.2)
PROT SERPL-MCNC: 6.5 G/DL (ref 6–8.5)
SODIUM SERPL-SCNC: 133 MMOL/L (ref 136–145)
TRIGL SERPL-MCNC: 83 MG/DL (ref 0–150)
TSH SERPL DL<=0.005 MIU/L-ACNC: 0.95 UIU/ML (ref 0.27–4.2)
VLDLC SERPL CALC-MCNC: 15 MG/DL (ref 5–40)

## 2023-02-23 PROCEDURE — 99214 OFFICE O/P EST MOD 30 MIN: CPT | Performed by: FAMILY MEDICINE

## 2023-02-23 NOTE — PROGRESS NOTES
Subjective     Sneha Cook is a 77 y.o. female.     Chief Complaint   Patient presents with   • Cellulitis     Follow up.Right foot, patient is fasting.   • Discuss medication   • spinal stenosis     Want to make sure she is good to travel       History of Present Illness     She has cellulitis of the Rt 3rd toe for ~ 10 days , last OV started on Augmen, she stated that the redness/pain and swelling much improved.   Still waiting for pod appoint   She is asking to go over her medication lists to updated   She has chronic back pain with spinal stenosis and DDD , taking mobic PRN , has on/off foot numbness.  She is following with chiropractor. Has on/off back pain  She is planning to go to [place] in April , wants to make sure her medication updated and check everything before she goes.   She request labs done today as she is fasting.   She is eating more HD  I reviewed her medication list;   She is on PPI for GERD;  doing well on PPI  HLD; doing well on diet and statin   Hypothyroid; doing well on levothyroxine 50 mcg in am  Taking losartan for HTN, well controlled   On MTF for DM        The following portions of the patient's history were reviewed and updated as appropriate: allergies, current medications, past family history, past medical history, past social history, past surgical history and problem list.        Review of Systems   Musculoskeletal: Positive for back pain and myalgias.       Vitals:    02/23/23 0908   BP: 132/80   Pulse: 79   Resp: 16   Temp: 97.5 °F (36.4 °C)   SpO2: 99%           02/23/23  0908   Weight: 79.4 kg (175 lb)         Body mass index is 29.12 kg/m².      Current Outpatient Medications   Medication Sig Dispense Refill   • amoxicillin-clavulanate (Augmentin) 875-125 MG per tablet Take 1 tablet by mouth 2 (Two) Times a Day. 20 tablet 0   • levothyroxine (SYNTHROID, LEVOTHROID) 50 MCG tablet Take 50 mcg by mouth Daily.     • losartan (COZAAR) 100 MG tablet Take 100 mg by mouth  Daily.     • meloxicam (MOBIC) 15 MG tablet Take 1 tablet by mouth Daily. 90 tablet 0   • metFORMIN ER (GLUCOPHAGE-XR) 750 MG 24 hr tablet Take 1 tablet by mouth Daily With Breakfast. 90 tablet 1   • Omega-3 Fatty Acids (fish oil) 1000 MG capsule capsule Take  by mouth Daily With Breakfast.     • pantoprazole (PROTONIX) 40 MG EC tablet Take 40 mg by mouth Daily.     • simvastatin (ZOCOR) 40 MG tablet Take 40 mg by mouth Every Night.       No current facility-administered medications for this visit.                Objective   Physical Exam  Vitals and nursing note reviewed.   Constitutional:       General: She is not in acute distress.     Appearance: She is not ill-appearing, toxic-appearing or diaphoretic.   Skin:     General: Skin is warm.      Comments: The redness /swelling and TTP of the rt 3rd toe improving   Neurological:      Mental Status: She is alert and oriented to person, place, and time.   Psychiatric:         Mood and Affect: Mood normal.         Behavior: Behavior normal.         Thought Content: Thought content normal.         Judgment: Judgment normal.           Assessment & Plan   Diagnoses and all orders for this visit:    1. Cellulitis of toe of right foot (Primary);  - Sx improving , continue Abx  - Our referral team made her appoin with pod today, will be on 3/6    2. Dyslipidemia  -     Lipid Panel  - Continue diet + statin     3. Chronic GERD;  - Advised to hold on PPI for few days, if she dose not have any GERD sx ok to d/c , if not ok to be back on it   - Continue HD    4. Acquired hypothyroidism  -     TSH  - Will adjust dose accordingly     5. Radicular low back pain;  - Continue Mobic prn, OK WITH TYLENOL ARTHRITIS PRN     6. Primary hypertension  - Stable, continue current Rx  -     Comprehensive Metabolic Panel    Medication list reviewed with pt and discussed.   Patient was given instructions and counseling regarding her condition or for health maintenance advice.   Please see  specific information pulled into the AVS if appropriate.   Medical assistant and I wore mask and eyewear protection during entire encounter.    Patient wore mask.         I have fully discussed the nature of the medical condition(s) risks, complications, management, safe and proper use of medications.   She stated no allergy to the above prescribed medication.  I have discussed the SIDE EFFECT OF MEDICATION and importance TO report any side effect , the patient expressed good understanding.  Encouraged medication compliance and the importance of keeping scheduled follow up appointments with me and any other providers.    Patient instructed to follow up with our office for results on any labs/imaging ordered during this visit.    Home care discussed  All questions answered  Patient verbalizes understanding and agrees to treatment plan.     Follow up: Return in about 7 weeks (around 4/10/2023).

## 2023-03-02 DIAGNOSIS — E03.9 HYPOTHYROIDISM, UNSPECIFIED TYPE: ICD-10-CM

## 2023-03-02 DIAGNOSIS — M48.062 SPINAL STENOSIS OF LUMBAR REGION WITH NEUROGENIC CLAUDICATION: ICD-10-CM

## 2023-03-02 DIAGNOSIS — E11.9 DM TYPE 2, GOAL HBA1C < 7%: ICD-10-CM

## 2023-03-02 DIAGNOSIS — E78.5 HYPERLIPIDEMIA, UNSPECIFIED HYPERLIPIDEMIA TYPE: ICD-10-CM

## 2023-03-02 RX ORDER — METFORMIN HYDROCHLORIDE 750 MG/1
750 TABLET, EXTENDED RELEASE ORAL
Qty: 30 TABLET | Refills: 0 | Status: SHIPPED | OUTPATIENT
Start: 2023-03-02 | End: 2023-03-28 | Stop reason: SDUPTHER

## 2023-03-02 RX ORDER — SIMVASTATIN 40 MG
40 TABLET ORAL NIGHTLY
Qty: 90 TABLET | Refills: 1 | Status: SHIPPED | OUTPATIENT
Start: 2023-03-02

## 2023-03-02 RX ORDER — PANTOPRAZOLE SODIUM 40 MG/1
40 TABLET, DELAYED RELEASE ORAL DAILY
Qty: 90 TABLET | Refills: 1 | Status: SHIPPED | OUTPATIENT
Start: 2023-03-02

## 2023-03-02 RX ORDER — MELOXICAM 15 MG/1
15 TABLET ORAL DAILY
Qty: 90 TABLET | Refills: 0 | Status: SHIPPED | OUTPATIENT
Start: 2023-03-02 | End: 2023-03-28

## 2023-03-02 RX ORDER — METFORMIN HYDROCHLORIDE 750 MG/1
750 TABLET, EXTENDED RELEASE ORAL
Qty: 90 TABLET | Refills: 1 | Status: SHIPPED | OUTPATIENT
Start: 2023-03-02 | End: 2023-03-02 | Stop reason: SDUPTHER

## 2023-03-02 RX ORDER — LOSARTAN POTASSIUM 100 MG/1
100 TABLET ORAL DAILY
Qty: 90 TABLET | Refills: 1 | Status: SHIPPED | OUTPATIENT
Start: 2023-03-02

## 2023-03-02 RX ORDER — LEVOTHYROXINE SODIUM 0.05 MG/1
50 TABLET ORAL DAILY
Qty: 90 TABLET | Refills: 1 | Status: SHIPPED | OUTPATIENT
Start: 2023-03-02

## 2023-03-02 NOTE — TELEPHONE ENCOUNTER
Caller: [facility] - [place], [place] - [address] - [phone] [facility] [phone] FX    Relationship: Pharmacy    Best call back number: [phone] (CALL IN) [phone] (PAPER FAX)     Requested Prescriptions:   Requested Prescriptions     Pending Prescriptions Disp Refills   • metFORMIN ER (GLUCOPHAGE-XR) 750 MG 24 hr tablet 90 tablet 1     Sig: Take 1 tablet by mouth Daily With Breakfast.   • meloxicam (MOBIC) 15 MG tablet 90 tablet 0     Sig: Take 1 tablet by mouth Daily.   • levothyroxine (SYNTHROID, LEVOTHROID) 50 MCG tablet       Sig: Take 1 tablet by mouth Daily.   • pantoprazole (PROTONIX) 40 MG EC tablet       Sig: Take 1 tablet by mouth Daily.   • losartan (COZAAR) 100 MG tablet       Sig: Take 1 tablet by mouth Daily.   • simvastatin (ZOCOR) 40 MG tablet 90 tablet      Sig: Take 1 tablet by mouth Every Night.        Pharmacy where request should be sent: [facility] - [place], [place] - [address] - [phone] [facility] [phone] FX     Additional details provided by patient: PATIENT ALSO NEEDS CYCLOBENZAPRINE 10 MG (NOT ON MED LIST). PATIENT IS OUT OF METFORMIN ER.  CAN PATIENT HAVE A REFILL OF THE METFORMIN. PATIENT WILL ONLY USE [facility] IF SHE NEEDS TO.     Does the patient have less than a 3 day supply:  [x] Yes  [] No    Would you like a call back once the refill request has been completed: [] Yes [x] No    If the office needs to give you a call back, can they leave a voicemail: [] Yes [x] No    Rachana Rainey Rep   03/02/23 14:49 EST

## 2023-03-02 NOTE — TELEPHONE ENCOUNTER
Caller: [facility] - [place], [place] - [address] - [phone] [facility] [phone] FX    Relationship: Pharmacy    Best call back number: [phone]    Requested Prescriptions:   Requested Prescriptions     Pending Prescriptions Disp Refills   • metFORMIN ER (GLUCOPHAGE-XR) 750 MG 24 hr tablet 90 tablet 1     Sig: Take 1 tablet by mouth Daily With Breakfast.        Pharmacy where request should be sent: [id number] [place] [address] AT [facility] [address] - [phone] [facility] [phone] FX     Additional details provided by patient: PATIENT NEEDS A SHORT ORDER SUPPLY SENT TO THIS PHARMACY WHILE WAITING ON MAIL ORDER SUPPLY - PATIENT IS COMPLETELY OUT OF MEDICATION.    Does the patient have less than a 3 day supply:  [x] Yes  [] No    Would you like a call back once the refill request has been completed: [] Yes [x] No    If the office needs to give you a call back, can they leave a voicemail: [] Yes [x] No    Rachana Jameson Rep   03/02/23 16:19 EST

## 2023-03-28 ENCOUNTER — TELEPHONE (OUTPATIENT)
Dept: FAMILY MEDICINE CLINIC | Facility: CLINIC | Age: 78
End: 2023-03-28

## 2023-03-28 DIAGNOSIS — E11.9 DM TYPE 2, GOAL HBA1C < 7%: ICD-10-CM

## 2023-03-28 RX ORDER — CYCLOBENZAPRINE HCL 10 MG
10 TABLET ORAL 3 TIMES DAILY PRN
Qty: 90 TABLET | Refills: 1 | Status: SHIPPED | OUTPATIENT
Start: 2023-03-28 | End: 2023-08-03

## 2023-03-28 RX ORDER — CYCLOBENZAPRINE HCL 10 MG
10 TABLET ORAL 3 TIMES DAILY PRN
COMMUNITY
End: 2023-03-28 | Stop reason: SDUPTHER

## 2023-03-28 RX ORDER — METFORMIN HYDROCHLORIDE 750 MG/1
750 TABLET, EXTENDED RELEASE ORAL
Qty: 90 TABLET | Refills: 1 | Status: SHIPPED | OUTPATIENT
Start: 2023-03-28 | End: 2023-04-06

## 2023-03-28 NOTE — TELEPHONE ENCOUNTER
Caller: Sneha Cook    Relationship: Self    Best call back number: [phone]    What medication are you requesting: cyclobenzaprine (FLEXERIL) 10 MG tablet, metFORMIN ER (GLUCOPHAGE-XR) 750 MG 24 hr tablet    If a prescription is needed, what is your preferred pharmacy and phone number: [facility] - [place] [address] [address] [facility] - [phone] [facility] [phone] FX     Additional notes: PATIENT IS REQUESTING 90 DAY SUPPLY OF THESE MEDICATIONS.     PATIENT STATED SHE HAS NOT BEEN RECEIVING THE CYCLOBENZAPRINE AS SHE SHOULD HAVE BEEN.    PATIENT STATED SHE WILL BE LEAVING [place]TRY ON 04-, AND WANTS TO MAKE SURE SHE RECEIVES THESE BEFORE THEN.    PATIENT STATED SHE SHOULD BE RECEIVING 90 DAY SUPPLY OF  metFORMIN ER (GLUCOPHAGE-XR) 750 MG 24 hr tablet, pantoprazole (PROTONIX) 40 MG EC tablet, levothyroxine (SYNTHROID, LEVOTHROID) 50 MCG tablet, simvastatin (ZOCOR) 40 MG tablet, losartan (COZAAR) 100 MG tablet, cyclobenzaprine (FLEXERIL) 10 MG tablet.    PATIENT STATED SHE IS NO LONGER TAKING THE meloxicam (MOBIC) 15 MG tablet DUE TO KIDNEY ISSUES.

## 2023-04-03 ENCOUNTER — TELEPHONE (OUTPATIENT)
Dept: FAMILY MEDICINE CLINIC | Facility: CLINIC | Age: 78
End: 2023-04-03
Payer: MEDICARE

## 2023-04-03 NOTE — TELEPHONE ENCOUNTER
Do you mean A1c ?  Last check was 2/16.   It is not time for checking A1c unless she will pay for it   Insurance coverage every 3 months

## 2023-04-03 NOTE — TELEPHONE ENCOUNTER
Pt, is going to [place] at the end of April for 6 weeks and her ALC was borderline, she would like to have labs done before she go is that possible?

## 2023-04-06 DIAGNOSIS — E11.9 DM TYPE 2, GOAL HBA1C < 7%: ICD-10-CM

## 2023-04-06 RX ORDER — METFORMIN HYDROCHLORIDE 750 MG/1
750 TABLET, EXTENDED RELEASE ORAL
Qty: 30 TABLET | Refills: 0 | Status: SHIPPED | OUTPATIENT
Start: 2023-04-06

## 2023-04-07 ENCOUNTER — TELEPHONE (OUTPATIENT)
Dept: FAMILY MEDICINE CLINIC | Facility: CLINIC | Age: 78
End: 2023-04-07

## 2023-04-07 NOTE — TELEPHONE ENCOUNTER
Caller: Sneha Cook    Relationship: Self    Best call back number: [phone]    What is the best time to reach you: ANY TIME    Who are you requesting to speak with (clinical staff, provider,  specific staff member): DR. GUAMAN    What was the call regarding: PATIENT STATES SHE IS CURRENTLY TAKING cyclobenzaprine (FLEXERIL) 10 MG  tablet AT NIGHT TIME AND WAS WONDERING IF IT WAS FOR MUSCLE SPASMS AND IF SHE NEEDS A REFILL. SHE STATES SHE HAS BEEN TAKING MAGNESIUM FOR IT AND DIDN'T KNOW IF SHE NEEDING TO CONTINUE TAKING THE OTHER MEDICATION.     PLEASE ADVISE.

## 2023-04-10 ENCOUNTER — OFFICE VISIT (OUTPATIENT)
Dept: FAMILY MEDICINE CLINIC | Facility: CLINIC | Age: 78
End: 2023-04-10
Payer: MEDICARE

## 2023-04-10 VITALS
TEMPERATURE: 97.7 F | HEIGHT: 65 IN | OXYGEN SATURATION: 98 % | BODY MASS INDEX: 29.16 KG/M2 | SYSTOLIC BLOOD PRESSURE: 138 MMHG | DIASTOLIC BLOOD PRESSURE: 80 MMHG | WEIGHT: 175 LBS | RESPIRATION RATE: 16 BRPM | HEART RATE: 68 BPM

## 2023-04-10 DIAGNOSIS — E11.9 DM TYPE 2, GOAL HBA1C < 7%: Primary | ICD-10-CM

## 2023-04-10 DIAGNOSIS — E89.0 POSTOPERATIVE HYPOTHYROIDISM: ICD-10-CM

## 2023-04-10 DIAGNOSIS — I73.9 COLD FOOT WITH PERIPHERAL VASCULAR DISEASE: ICD-10-CM

## 2023-04-10 DIAGNOSIS — L81.9 DISCOLORATION OF SKIN OF FOOT: ICD-10-CM

## 2023-04-10 DIAGNOSIS — E78.5 DYSLIPIDEMIA: ICD-10-CM

## 2023-04-10 DIAGNOSIS — I10 PRIMARY HYPERTENSION: ICD-10-CM

## 2023-04-10 NOTE — PROGRESS NOTES
Subjective     Sneha Cook is a 77 y.o. female.     Chief Complaint   Patient presents with   • Cellulitis     7 week follow up. Still have tingling in her toes. Needs another referral.   • Hyperlipidemia     Discuss medication options.   • Hypothyroidism   • Hypertension   • Diabetes       History of Present Illness     Pt with well controlled DM Type 2 ( on MTF and HD) . She is c/o numbness with pain and discoloration of her foot on both sides, Rt side > Lt side . She noticed worsen ing sx , more blue discoloration of her Rt toes , always cold with some pain when she walks. She is former smoker.   [place] will go [place] and then to [place] within 2 weeks.   She had cellulitis of the Rt foot, sx better with Abx.   She suppose to see pod but her appoin with cancelled.   She has chronic back pain with spinal stenosis and lumber DDD with on/off foot numbness, she stopped taking mobic concerning for S/E , tylenol dose not help at all, taking M relaxant and ASA PRN with some help.  She is following with chiropractor, karis did some manipulation with great help.       Lab Results   Component Value Date    HGBA1C 6.9 02/16/2023         Lab Results   Component Value Date    CHLPL 177 02/23/2023    TRIG 83 02/23/2023    HDL 90 (H) 02/23/2023    LDL 72 02/23/2023       Hypothyroidism s/p thyroidectomy; Doing well on Levothyr 50 mcg.  Last TSH was wnl    Lab Results   Component Value Date    TSH 0.951 02/23/2023      HTN; her BP lately mildly elevated than before , eats HD , doing well on losartan       The following portions of the patient's history were reviewed and updated as appropriate: allergies, current medications, past family history, past medical history, past social history, past surgical history and problem list.        Review of Systems   Skin: Positive for color change.   Neurological: Positive for numbness.       Vitals:    04/10/23 1230   BP: 138/80   Pulse: 68   Resp: 16   Temp: 97.7 °F (36.5 °C)   SpO2:  98%           04/10/23  1230   Weight: 79.4 kg (175 lb)         Body mass index is 29.12 kg/m².      Current Outpatient Medications   Medication Sig Dispense Refill   • cyclobenzaprine (FLEXERIL) 10 MG tablet Take 1 tablet by mouth 3 (Three) Times a Day As Needed (prn). 90 tablet 1   • levothyroxine (SYNTHROID, LEVOTHROID) 50 MCG tablet Take 1 tablet by mouth Daily. 90 tablet 1   • losartan (COZAAR) 100 MG tablet Take 1 tablet by mouth Daily. 90 tablet 1   • Magnesium 400 MG capsule Take  by mouth.     • metFORMIN ER (GLUCOPHAGE-XR) 750 MG 24 hr tablet TAKE 1 TABLET BY MOUTH DAILY WITH BREAKFAST 30 tablet 0   • Omega-3 Fatty Acids (fish oil) 1000 MG capsule capsule Take  by mouth Daily With Breakfast.     • pantoprazole (PROTONIX) 40 MG EC tablet Take 1 tablet by mouth Daily. 90 tablet 1   • simvastatin (ZOCOR) 40 MG tablet Take 1 tablet by mouth Every Night. 90 tablet 1     No current facility-administered medications for this visit.                Objective   Physical Exam  Vitals and nursing note reviewed.   Constitutional:       General: She is not in acute distress.     Appearance: She is not ill-appearing, toxic-appearing or diaphoretic.   Cardiovascular:      Rate and Rhythm: Normal rate and regular rhythm.      Pulses: Normal pulses.      Heart sounds: Normal heart sounds.   Pulmonary:      Effort: Pulmonary effort is normal. No respiratory distress.      Breath sounds: Normal breath sounds. No stridor. No wheezing or rhonchi.   Skin:     Comments: Cold foot  Discoloration of the 3rd and 4th rt toes    Neurological:      Mental Status: She is alert and oriented to person, place, and time.   Psychiatric:         Mood and Affect: Mood normal.         Behavior: Behavior normal.         Thought Content: Thought content normal.           Assessment & Plan   Diagnoses and all orders for this visit:    1. DM type 2, goal HbA1c < 7% (Primary)  -     Cancel: POC Glycosylated Hemoglobin (Hb A1C)  - Well controlled ,  continue diet + MTF  - Working to get her in with pod stat before she goes to her trip to [place]     2. Discoloration of skin of foot;  - As above , concerning for PAD  -     Cancel: Doppler Ankle Brachial Index Single Level CAR; Future  -     Doppler Ankle Brachial Index Single Level CAR; Future    3. Cold foot with peripheral vascular disease  - As above , concerning for PAD  -     Cancel: Doppler Ankle Brachial Index Single Level CAR; Future  -     Doppler Ankle Brachial Index Single Level CAR; Future    4. Dyslipidemia;  - Stable, continue current Rx    5. Postoperative hypothyroidism  - Stable, continue current Rx    6. Primary hypertension  - Stable, continue current Rx  - Close BP monitoring and f/u        Patient was given instructions and counseling regarding her condition or for health maintenance advice.   Please see specific information pulled into the AVS if appropriate.       I have fully discussed the nature of the medical condition(s) risks, complications, management, safe and proper use of medications.   Pt stated no allergy to the above prescribed medication.  I have discussed the SIDE EFFECT OF MEDICATION and importance TO report any side effect , the patient expressed good understanding.  Encouraged medication compliance and the importance of keeping scheduled follow up appointments with me and any other providers.    Patient instructed to follow up with our office for results on any labs/imaging ordered during this visit.    Home care discussed  All questions answered  Patient verbalizes understanding and agrees to treatment plan.     Follow up: Return in about 3 months (around 7/10/2023) for medicare wellness.

## 2023-04-11 ENCOUNTER — HOSPITAL ENCOUNTER (OUTPATIENT)
Dept: CARDIOLOGY | Facility: HOSPITAL | Age: 78
Discharge: HOME | End: 2023-04-11
Admitting: FAMILY MEDICINE
Payer: MEDICARE

## 2023-04-11 ENCOUNTER — TELEPHONE (OUTPATIENT)
Dept: FAMILY MEDICINE CLINIC | Facility: CLINIC | Age: 78
End: 2023-04-11
Payer: MEDICARE

## 2023-04-11 DIAGNOSIS — I73.9 COLD FOOT WITH PERIPHERAL VASCULAR DISEASE: ICD-10-CM

## 2023-04-11 DIAGNOSIS — L81.9 DISCOLORATION OF SKIN OF FOOT: ICD-10-CM

## 2023-04-11 LAB
BH CV LOWER ARTERIAL LEFT ABI RATIO: 1.3
BH CV LOWER ARTERIAL LEFT DORSALIS PEDIS SYS MAX: 184
BH CV LOWER ARTERIAL LEFT GREAT TOE SYS MAX: 89
BH CV LOWER ARTERIAL LEFT POST TIBIAL SYS MAX: 172
BH CV LOWER ARTERIAL LEFT TBI RATIO: 0.63
BH CV LOWER ARTERIAL RIGHT ABI RATIO: 1.23
BH CV LOWER ARTERIAL RIGHT DORSALIS PEDIS SYS MAX: 174
BH CV LOWER ARTERIAL RIGHT GREAT TOE SYS MAX: 73
BH CV LOWER ARTERIAL RIGHT POST TIBIAL SYS MAX: 168
BH CV LOWER ARTERIAL RIGHT TBI RATIO: 0.51
MAXIMAL PREDICTED HEART RATE: 142 BPM
STRESS TARGET HR: 121 BPM
UPPER ARTERIAL LEFT ARM BRACHIAL SYS MAX: 142 MMHG
UPPER ARTERIAL RIGHT ARM BRACHIAL SYS MAX: 136 MMHG

## 2023-04-11 PROCEDURE — 93922 UPR/L XTREMITY ART 2 LEVELS: CPT

## 2023-04-17 DIAGNOSIS — M48.062 SPINAL STENOSIS OF LUMBAR REGION WITH NEUROGENIC CLAUDICATION: ICD-10-CM

## 2023-04-17 RX ORDER — MELOXICAM 15 MG/1
15 TABLET ORAL DAILY
Qty: 90 TABLET | Refills: 0 | Status: SHIPPED | OUTPATIENT
Start: 2023-04-17

## 2023-04-17 NOTE — TELEPHONE ENCOUNTER
Rx Refill Note  Requested Prescriptions     Pending Prescriptions Disp Refills   • meloxicam (MOBIC) 15 MG tablet [Pharmacy Med Name: MELOXICAM 15MG TABLETS] 90 tablet 0     Sig: TAKE 1 TABLET BY MOUTH DAILY      Last office visit with prescribing clinician: 4/10/2023   Last telemedicine visit with prescribing clinician: Visit date not found   Next office visit with prescribing clinician: Visit date not found                         Would you like a call back once the refill request has been completed: [] Yes [] No    If the office needs to give you a call back, can they leave a voicemail: [] Yes [] No    Yanet Trejo MA/LMR  04/17/23, 08:53 EDT

## 2023-07-27 ENCOUNTER — TELEPHONE (OUTPATIENT)
Dept: FAMILY MEDICINE CLINIC | Facility: CLINIC | Age: 78
End: 2023-07-27

## 2023-07-28 DIAGNOSIS — E03.9 HYPOTHYROIDISM, UNSPECIFIED TYPE: ICD-10-CM

## 2023-07-28 DIAGNOSIS — E78.5 HYPERLIPIDEMIA, UNSPECIFIED HYPERLIPIDEMIA TYPE: ICD-10-CM

## 2023-07-28 DIAGNOSIS — E11.9 DM TYPE 2, GOAL HBA1C < 7%: ICD-10-CM

## 2023-07-28 RX ORDER — SIMVASTATIN 40 MG
40 TABLET ORAL NIGHTLY
Qty: 90 TABLET | Refills: 1 | Status: SHIPPED | OUTPATIENT
Start: 2023-07-28 | End: 2023-07-28 | Stop reason: SDUPTHER

## 2023-07-28 RX ORDER — LOSARTAN POTASSIUM 100 MG/1
100 TABLET ORAL DAILY
Qty: 90 TABLET | Refills: 1 | Status: SHIPPED | OUTPATIENT
Start: 2023-07-28 | End: 2023-07-28 | Stop reason: SDUPTHER

## 2023-07-28 RX ORDER — METFORMIN HYDROCHLORIDE 750 MG/1
750 TABLET, EXTENDED RELEASE ORAL
Qty: 30 TABLET | Refills: 0 | Status: SHIPPED | OUTPATIENT
Start: 2023-07-28 | End: 2023-07-28 | Stop reason: SDUPTHER

## 2023-07-28 RX ORDER — SIMVASTATIN 40 MG
40 TABLET ORAL NIGHTLY
Qty: 90 TABLET | Refills: 1 | Status: SHIPPED | OUTPATIENT
Start: 2023-07-28

## 2023-07-28 RX ORDER — METFORMIN HYDROCHLORIDE 750 MG/1
750 TABLET, EXTENDED RELEASE ORAL
Qty: 30 TABLET | Refills: 0 | Status: SHIPPED | OUTPATIENT
Start: 2023-07-28

## 2023-07-28 RX ORDER — LOSARTAN POTASSIUM 100 MG/1
100 TABLET ORAL DAILY
Qty: 90 TABLET | Refills: 1 | Status: SHIPPED | OUTPATIENT
Start: 2023-07-28

## 2023-07-28 RX ORDER — PANTOPRAZOLE SODIUM 40 MG/1
40 TABLET, DELAYED RELEASE ORAL DAILY
Qty: 90 TABLET | Refills: 1 | Status: SHIPPED | OUTPATIENT
Start: 2023-07-28

## 2023-07-28 RX ORDER — LEVOTHYROXINE SODIUM 0.05 MG/1
50 TABLET ORAL DAILY
Qty: 90 TABLET | Refills: 1 | Status: SHIPPED | OUTPATIENT
Start: 2023-07-28 | End: 2023-07-28 | Stop reason: SDUPTHER

## 2023-07-28 RX ORDER — PANTOPRAZOLE SODIUM 40 MG/1
40 TABLET, DELAYED RELEASE ORAL DAILY
Qty: 90 TABLET | Refills: 1 | Status: SHIPPED | OUTPATIENT
Start: 2023-07-28 | End: 2023-07-28 | Stop reason: SDUPTHER

## 2023-07-28 RX ORDER — LEVOTHYROXINE SODIUM 0.05 MG/1
50 TABLET ORAL DAILY
Qty: 90 TABLET | Refills: 1 | Status: SHIPPED | OUTPATIENT
Start: 2023-07-28

## 2023-07-28 NOTE — TELEPHONE ENCOUNTER
Caller: Sneha Cook    Relationship: Self    Best call back number: [phone]     Requested Prescriptions:   Requested Prescriptions     Pending Prescriptions Disp Refills    metFORMIN ER (GLUCOPHAGE-XR) 750 MG 24 hr tablet 30 tablet 0     Sig: Take 1 tablet by mouth Daily With Breakfast.    levothyroxine (SYNTHROID, LEVOTHROID) 50 MCG tablet 90 tablet 1     Sig: Take 1 tablet by mouth Daily.    pantoprazole (PROTONIX) 40 MG EC tablet 90 tablet 1     Sig: Take 1 tablet by mouth Daily.    simvastatin (ZOCOR) 40 MG tablet 90 tablet 1     Sig: Take 1 tablet by mouth Every Night.    losartan (COZAAR) 100 MG tablet 90 tablet 1     Sig: Take 1 tablet by mouth Daily.        Pharmacy where request should be sent: [facility] - [place] [address] [address] [facility] - [phone] [facility] [phone] FX     Last office visit with prescribing clinician: 4/10/2023   Last telemedicine visit with prescribing clinician: Visit date not found   Next office visit with prescribing clinician: 8/3/2023     Additional details provided by patient: PATIENT STATED SHE WOULD LIKE HER PRESCRIPTIONS TO GO THROUGH [facility]. PAIENT STATED THAT SHE CURRENTLY HAS A 90 DAY SUPPLY OF TERBINAFINE BUT WOULD [facility] FUTURE REFILLS.    Does the patient have less than a 3 day supply:  [] Yes  [x] No    Would you like a call back once the refill request has been completed: [] Yes [x] No    If the office needs to give you a call back, can they leave a voicemail: [] Yes [x] No    Rachana Landon Rep   07/28/23 14:54 EDT

## 2023-07-28 NOTE — TELEPHONE ENCOUNTER
Caller: Sneha Cook    Relationship: Self    Best call back number: [phone]     Requested Prescriptions:   Requested Prescriptions     Pending Prescriptions Disp Refills    metFORMIN ER (GLUCOPHAGE-XR) 750 MG 24 hr tablet 30 tablet 0     Sig: Take 1 tablet by mouth Daily With Breakfast.    pantoprazole (PROTONIX) 40 MG EC tablet 90 tablet 1     Sig: Take 1 tablet by mouth Daily.    levothyroxine (SYNTHROID, LEVOTHROID) 50 MCG tablet 90 tablet 1     Sig: Take 1 tablet by mouth Daily.    losartan (COZAAR) 100 MG tablet 90 tablet 1     Sig: Take 1 tablet by mouth Daily.    simvastatin (ZOCOR) 40 MG tablet 90 tablet 1     Sig: Take 1 tablet by mouth Every Night.        Pharmacy where request should be sent: [facility] - [place] [address] [address] [facility] - [phone] [facility] [phone] FX     Last office visit with prescribing clinician: 4/10/2023   Last telemedicine visit with prescribing clinician: Visit date not found   Next office visit with prescribing clinician: 8/3/2023     Additional details provided by patient: PATIENT STATED SHE IS OUT OF HER THYROID MEDICATION    Does the patient have less than a 3 day supply:  [x] Yes  [] No    Would you like a call back once the refill request has been completed: [x] Yes [] No    If the office needs to give you a call back, can they leave a voicemail: [x] Yes [] No    Rachana Al Rep   07/28/23 10:27 EDT

## 2023-08-01 DIAGNOSIS — E78.5 HYPERLIPIDEMIA, UNSPECIFIED HYPERLIPIDEMIA TYPE: ICD-10-CM

## 2023-08-01 DIAGNOSIS — Z11.59 NEED FOR HEPATITIS C SCREENING TEST: ICD-10-CM

## 2023-08-01 DIAGNOSIS — I10 PRIMARY HYPERTENSION: ICD-10-CM

## 2023-08-01 DIAGNOSIS — E11.9 DM TYPE 2, GOAL HBA1C < 7%: Primary | ICD-10-CM

## 2023-08-03 ENCOUNTER — OFFICE VISIT (OUTPATIENT)
Dept: FAMILY MEDICINE CLINIC | Facility: CLINIC | Age: 78
End: 2023-08-03
Payer: MEDICARE

## 2023-08-03 VITALS
HEART RATE: 78 BPM | OXYGEN SATURATION: 95 % | WEIGHT: 178 LBS | SYSTOLIC BLOOD PRESSURE: 122 MMHG | TEMPERATURE: 97.7 F | DIASTOLIC BLOOD PRESSURE: 78 MMHG | HEIGHT: 65 IN | RESPIRATION RATE: 16 BRPM | BODY MASS INDEX: 29.66 KG/M2

## 2023-08-03 DIAGNOSIS — E11.42 DM TYPE 2 WITH DIABETIC PERIPHERAL NEUROPATHY: ICD-10-CM

## 2023-08-03 DIAGNOSIS — Z23 NEED FOR PROPHYLACTIC VACCINATION AGAINST STREPTOCOCCUS PNEUMONIAE (PNEUMOCOCCUS): ICD-10-CM

## 2023-08-03 DIAGNOSIS — I10 PRIMARY HYPERTENSION: ICD-10-CM

## 2023-08-03 DIAGNOSIS — E89.0 POSTOPERATIVE HYPOTHYROIDISM: ICD-10-CM

## 2023-08-03 DIAGNOSIS — Z00.00 MEDICARE ANNUAL WELLNESS VISIT, SUBSEQUENT: Primary | ICD-10-CM

## 2023-08-03 DIAGNOSIS — Z78.0 MENOPAUSE: ICD-10-CM

## 2023-08-03 DIAGNOSIS — E78.5 DYSLIPIDEMIA: ICD-10-CM

## 2023-08-03 LAB
EXPIRATION DATE: NORMAL
HBA1C MFR BLD: 7 %
Lab: NORMAL

## 2023-08-03 RX ORDER — TERBINAFINE HYDROCHLORIDE 250 MG/1
1 TABLET ORAL DAILY
COMMUNITY
Start: 2023-07-19

## 2023-08-03 NOTE — PATIENT INSTRUCTIONS
Medicare Wellness  Personal Prevention Plan of Service     Date of Office Visit:    Encounter Provider:  Felix Barone MD  Place of Service:  [facility]  Patient Name: Sneha Cook  :  1945    As part of the Medicare Wellness portion of your visit today, we are providing you with this personalized preventive plan of services (PPPS). This plan is based upon recommendations of the [place] Preventive Services Task Force (USPSTF) and the Advisory Committee on Immunization Practices (ACIP).    This lists the preventive care services that should be considered, and provides dates of when you are due. Items listed as completed are up-to-date and do not require any further intervention.    Health Maintenance   Topic Date Due    URINE MICROALBUMIN  Never done    DXA SCAN  Never done    Pneumococcal Vaccine 65+ (1 - PCV) Never done    TDAP/TD VACCINES (1 - Tdap) Never done    ZOSTER VACCINE (1 of 2) Never done    COVID-19 Vaccine (4 - Pfizer series) 2021    HEPATITIS C SCREENING  Never done    ANNUAL WELLNESS VISIT  Never done    DIABETIC EYE EXAM  Never done    INFLUENZA VACCINE  10/01/2023    HEMOGLOBIN A1C  2024    LIPID PANEL  2024       Orders Placed This Encounter   Procedures    POC Glycosylated Hemoglobin (Hb A1C)     Order Specific Question:   Release to patient     Answer:   Routine Release       No follow-ups on file.

## 2023-08-03 NOTE — PROGRESS NOTES
The ABCs of the Annual Wellness Visit  Subsequent Medicare Wellness Visit    Jackson Cook is a 78 y.o. female who presents for a Subsequent Medicare Wellness Visit.    The following portions of the patient's history were reviewed and   updated as appropriate: allergies, current medications, past family history, past medical history, past social history, past surgical history, and problem list.    Compared to one year ago, the patient feels her physical   health is worse due to PN.    Compared to one year ago, the patient feels her mental   health is the same.    Recent Hospitalizations:  She was not admitted to the hospital during the last year.       Current Medical Providers:  Patient Care Team:  Felix Barone MD as PCP - General (Urgent Care)    Outpatient Medications Prior to Visit   Medication Sig Dispense Refill    levothyroxine (SYNTHROID, LEVOTHROID) 50 MCG tablet Take 1 tablet by mouth Daily. 90 tablet 1    losartan (COZAAR) 100 MG tablet Take 1 tablet by mouth Daily. 90 tablet 1    Magnesium 400 MG capsule Take  by mouth.      metFORMIN ER (GLUCOPHAGE-XR) 750 MG 24 hr tablet Take 1 tablet by mouth Daily With Breakfast. 30 tablet 0    Omega-3 Fatty Acids (fish oil) 1000 MG capsule capsule Take  by mouth Daily With Breakfast.      pantoprazole (PROTONIX) 40 MG EC tablet Take 1 tablet by mouth Daily. 90 tablet 1    simvastatin (ZOCOR) 40 MG tablet Take 1 tablet by mouth Every Night. 90 tablet 1    terbinafine (lamiSIL) 250 MG tablet Take 1 tablet by mouth Daily.      cyclobenzaprine (FLEXERIL) 10 MG tablet Take 1 tablet by mouth 3 (Three) Times a Day As Needed (prn). (Patient not taking: Reported on 8/3/2023) 90 tablet 1    meloxicam (MOBIC) 15 MG tablet TAKE 1 TABLET BY MOUTH DAILY (Patient not taking: Reported on 8/3/2023) 90 tablet 0     No facility-administered medications prior to visit.       No opioid medication identified on active medication list. I have reviewed chart for  "other potential  high risk medication/s and harmful drug interactions in the elderly.        Aspirin is not on active medication list.  Aspirin use is not indicated based on review of current medical condition/s. Risk of harm outweighs potential benefits.  .    Patient Active Problem List   Diagnosis    Flexor carpi radialis tenosynovitis    Spinal stenosis of lumbar region with neurogenic claudication    Hypertension    Hypothyroidism    Insomnia    Hyperlipidemia     Advance Care Planning   Advance Care Planning     Advance Directive is not on file.  ACP discussion was held with the patient during this visit. Patient does not have an advance directive, information provided.     Objective    Vitals:    23 1103   BP: 122/78   Pulse: 78   Resp: 16   Temp: 97.7 øF (36.5 øC)   SpO2: 95%   Weight: 80.7 kg (178 lb)   Height: 165.1 cm (65\")   PainSc: 0-No pain     Estimated body mass index is 29.62 kg/mý as calculated from the following:    Height as of this encounter: 165.1 cm (65\").    Weight as of this encounter: 80.7 kg (178 lb).    BMI is >= 25 and <30. (Overweight) The following options were offered after discussion;: exercise counseling/recommendations and nutrition counseling/recommendations      Does the patient have evidence of cognitive impairment? No    Lab Results   Component Value Date    HGBA1C 7 2023        HEALTH RISK ASSESSMENT    Smoking Status:  Social History     Tobacco Use   Smoking Status Former    Packs/day: 1.00    Years: 15.00    Pack years: 15.00    Types: Cigarettes    Quit date: 1970    Years since quittin.6   Smokeless Tobacco Never     Alcohol Consumption:  Social History     Substance and Sexual Activity   Alcohol Use Not Currently    Comment: Stopped all alcohol in       Fall Risk Screen:    STEADI Fall Risk Assessment was completed, and patient is at HIGH risk for falls. Assessment completed on:8/3/2023    Depression Screenin/3/2023    11:08 AM "   PHQ-2/PHQ-9 Depression Screening   Little Interest or Pleasure in Doing Things 0-->not at all   Feeling Down, Depressed or Hopeless 0-->not at all   PHQ-9: Brief Depression Severity Measure Score 0       Health Habits and Functional and Cognitive Screenin/3/2023    11:05 AM   Functional & Cognitive Status   Do you have difficulty preparing food and eating? No   Do you have difficulty bathing yourself, getting dressed or grooming yourself? No   Do you have difficulty using the toilet? No   Do you have difficulty moving around from place to place? No   Do you have trouble with steps or getting out of a bed or a chair? No   Current Diet Well Balanced Diet   Dental Exam Up to date   Eye Exam Up to date   Exercise (times per week) 7 times per week   Current Exercises Include Walking   Do you need help using the phone?  No   Are you deaf or do you have serious difficulty hearing?  Yes   Do you need help to go to places out of walking distance? No   Do you need help shopping? No   Do you need help preparing meals?  No   Do you need help with housework?  No   Do you need help with laundry? No   Do you need help taking your medications? No   Do you need help managing money? No   Do you ever drive or ride in a car without wearing a seat belt? No   Have you felt unusual stress, anger or loneliness in the last month? No   Who do you live with? Child   If you need help, do you have trouble finding someone available to you? No   Have you been bothered in the last four weeks by sexual problems? No   Do you have difficulty concentrating, remembering or making decisions? No       Age-appropriate Screening Schedule:  Refer to the list below for future screening recommendations based on patient's age, sex and/or medical conditions. Orders for these recommended tests are listed in the plan section. The patient has been provided with a written plan.    Health Maintenance   Topic Date Due    URINE MICROALBUMIN  Never done     DXA SCAN  Never done    Pneumococcal Vaccine 65+ (1 - PCV) Never done    TDAP/TD VACCINES (1 - Tdap) Never done    HEPATITIS C SCREENING  Never done    ZOSTER VACCINE (1 of 2) 08/03/2023 (Originally 6/10/1995)    COVID-19 Vaccine (4 - Pfizer series) 08/05/2023 (Originally 12/23/2021)    INFLUENZA VACCINE  10/01/2023    HEMOGLOBIN A1C  02/03/2024    LIPID PANEL  02/23/2024    DIABETIC EYE EXAM  04/11/2024    ANNUAL WELLNESS VISIT  08/03/2024                  CMS Preventative Services Quick Reference  Risk Factors Identified During Encounter  Polypharmacy: Medication List reviewed and Medications are appropriate for patient  Dental Screening Recommended  Vision Screening Recommended  Vaccination discussed   Obesity; discussed   The above risks/problems have been discussed with the patient.  Pertinent information has been shared with the patient in the After Visit Summary.  An After Visit Summary and PPPS were made available to the patient.    Follow Up:   Next Medicare Wellness visit to be scheduled in 1 year.       Additional E&M Note during same encounter follows:  Patient has multiple medical problems which are significant and separately identifiable that require additional work above and beyond the Medicare Wellness Visit.      Chief Complaint  Medicare Wellness-subsequent (Patient thinks she has a living well. Patient not fasting.), Weight Loss (Discuss option's medication, short term.), Numbness (Toes and feet.), and Diabetes    Subjective        HPI  Sneha IVET Cook is also being seen today for f/u on DM, OBESITY, toenails discoloration     DM type 2; She eats HD , try to cut down on bread , eats non added sugar sweet   Last A1c was 6.9, today is 7  Has bilateral feet and toes numbness   Following with Pod every 3 months     Lab Results   Component Value Date    HGBA1C 7 08/03/2023     Obesity; she keeps gaining wt despite eating HD   Her plan to loss 10 iBS -15 iBS    Walks 2-4 mile /day     She is c/o  "chronic and worsening bilateral toenails discoloration and thickening, saw pod who put her on Lamisil       Review of Systems   Respiratory:  Negative for cough.    Neurological:  Positive for numbness.     Objective   Vital Signs:  /78   Pulse 78   Temp 97.7 øF (36.5 øC)   Resp 16   Ht 165.1 cm (65\")   Wt 80.7 kg (178 lb)   SpO2 95%   BMI 29.62 kg/mý     Physical Exam  Vitals and nursing note reviewed.   Constitutional:       General: She is not in acute distress.     Appearance: She is not ill-appearing, toxic-appearing or diaphoretic.   HENT:      Head: Normocephalic.      Right Ear: Tympanic membrane, ear canal and external ear normal.      Left Ear: Tympanic membrane, ear canal and external ear normal.      Nose: Nose normal. No congestion or rhinorrhea.      Mouth/Throat:      Mouth: Mucous membranes are moist.      Pharynx: Oropharynx is clear. No oropharyngeal exudate or posterior oropharyngeal erythema.   Eyes:      General: No scleral icterus.        Right eye: No discharge.         Left eye: No discharge.      Extraocular Movements: Extraocular movements intact.      Conjunctiva/sclera: Conjunctivae normal.      Pupils: Pupils are equal, round, and reactive to light.   Neck:      Comments: No enlarged thyroid      Cardiovascular:      Rate and Rhythm: Normal rate and regular rhythm.      Heart sounds: Normal heart sounds. No murmur heard.    No friction rub. No gallop.   Pulmonary:      Effort: Pulmonary effort is normal. No respiratory distress.      Breath sounds: Normal breath sounds. No stridor. No wheezing, rhonchi or rales.   Abdominal:      General: Bowel sounds are normal. There is no distension.      Palpations: Abdomen is soft. There is no mass.      Tenderness: There is no abdominal tenderness. There is no right CVA tenderness, left CVA tenderness, guarding or rebound.      Hernia: No hernia is present.   Musculoskeletal:         General: Normal range of motion.      Cervical back: " Normal range of motion and neck supple.      Right lower leg: No edema.      Left lower leg: No edema.   Lymphadenopathy:      Cervical: No cervical adenopathy.   Skin:     General: Skin is warm.      Coloration: Skin is not jaundiced or pale.      Comments: Bilateral toenails onychomycosis    Neurological:      General: No focal deficit present.      Mental Status: She is alert and oriented to person, place, and time.      Cranial Nerves: No cranial nerve deficit.      Sensory: No sensory deficit.      Motor: No weakness.      Coordination: Coordination normal.      Gait: Gait normal.      Deep Tendon Reflexes: Reflexes normal.   Psychiatric:         Mood and Affect: Mood normal.         Behavior: Behavior normal.         Thought Content: Thought content normal.         Judgment: Judgment normal.                       Assessment and Plan   Diagnoses and all orders for this visit:    1. Medicare annual wellness visit, subsequent (Primary)    2. DM type 2 with diabetic peripheral neuropathy  Comments:  A1c going up, will add ozempic to help with wt loss as well   discussed diet and exercise  Orders:  -     POC Glycosylated Hemoglobin (Hb A1C)  -     Semaglutide,0.25 or 0.5MG/DOS, (OZEMPIC) 2 MG/1.5ML solution pen-injector; Inject 0.25 mg under the skin into the appropriate area as directed 1 (One) Time Per Week.  Dispense: 1.5 mL; Refill: 0    3. Need for prophylactic vaccination against Streptococcus pneumoniae (pneumococcus)  -     Pneumococcal Conjugate Vaccine 20-Valent (PCV20)    4. Menopause  -     DEXA Bone Density Axial; Future    5. Primary hypertension    6. Dyslipidemia    7. Postoperative hypothyroidism       We discussed preventative care including age and patient-appropriate immunizations and cancer screening. We also discussed the importance of exercise and a healthy diet. This is their annual preventative exam.      I spent 55 minutes caring for Christine on this date of service. This time includes time  spent by me in the following activities:preparing for the visit, reviewing tests, obtaining and/or reviewing a separately obtained history, performing a medically appropriate examination and/or evaluation , counseling and educating the patient/family/caregiver, ordering medications, tests, or procedures, referring and communicating with other health care professionals , documenting information in the medical record, independently interpreting results and communicating that information with the patient/family/caregiver and care coordination            Follow Up   Return in about 2 months (around 10/3/2023) for follow up on current illness.  Patient was given instructions and counseling regarding her condition or for health maintenance advice. Please see specific information pulled into the AVS if appropriate.

## 2023-08-10 ENCOUNTER — TELEPHONE (OUTPATIENT)
Dept: FAMILY MEDICINE CLINIC | Facility: CLINIC | Age: 78
End: 2023-08-10
Payer: MEDICARE

## 2023-08-10 NOTE — TELEPHONE ENCOUNTER
Caller: Sneha Cook     Relationship: PATIENT    Best call back number: [phone]     What is your medical concern?     PATIENT IS WANTING THE DOCTOR TO CHECK AND SEE IF SHE HAD A BONE DENSITY TEST DONE IN THE PAST YEAR.  SHE THINKS SHE DID.  SHE HAS A PENDING TEST COMING UP IN 2 WEEKS.  SHE HAD THIS DONE WHEN SHE WAS UNDER DR FRANCOIS FOSTER'S CARE AND SHOULD BE A PART OF HER MEDICAL RECORDS.    ALSO PATIENT HAS BEEN CONSTIPATED FOR 6 DAYS AND UNABLE TO GET ANY RELIEF.  SHE HAS RECENTLY CHANGED HER EATING HABITS OF NO JUNK FOOD JUST DAIRY, FRUITS AND VEGETABLE ETC.  SHE WOULD LIKE FOR HER TO SEND HER IN SOMETHING TO HELP WITH THIS ISSUE.      [id number] [address] AT [facility] [address] - [phone] [facility] [phone] FX       PLEASE ADVISE

## 2023-08-10 NOTE — TELEPHONE ENCOUNTER
I have called Dr. Nena Blakely' office they told me she had one done at [facility]. Called [facility] regarding Bone density no record of patient having this test done. We do have last office notes from Dr. Blakely office in media, please advise.

## 2023-08-10 NOTE — TELEPHONE ENCOUNTER
Reviewed last note from previous PCP , did not mention any things about DEXA   Plz let her know about our findings   For constipation; she can take mirilax from OTC   Insurance dose not cover constipation medication

## 2023-08-14 ENCOUNTER — TELEPHONE (OUTPATIENT)
Dept: FAMILY MEDICINE CLINIC | Facility: CLINIC | Age: 78
End: 2023-08-14
Payer: MEDICARE

## 2023-08-14 NOTE — TELEPHONE ENCOUNTER
Patient attempted to return MA's call regarding her symptoms and constipation. Please advise , thank you.

## 2023-08-15 NOTE — TELEPHONE ENCOUNTER
Patient informed, she stated she has been taking Miralax, and she hasn't received a call about her Dexa scan to be schedule please advise.

## 2023-08-17 DIAGNOSIS — E87.1 LOW SODIUM LEVELS: ICD-10-CM

## 2023-08-17 DIAGNOSIS — I10 PRIMARY HYPERTENSION: Primary | ICD-10-CM

## 2023-08-17 LAB
ALBUMIN/CREAT UR: 4 MG/G CREAT (ref 0–29)
BUN SERPL-MCNC: 21 MG/DL (ref 8–23)
BUN/CREAT SERPL: 20 (ref 7–25)
CALCIUM SERPL-MCNC: 10.1 MG/DL (ref 8.6–10.5)
CHLORIDE SERPL-SCNC: 92 MMOL/L (ref 98–107)
CO2 SERPL-SCNC: 27.2 MMOL/L (ref 22–29)
CREAT SERPL-MCNC: 1.05 MG/DL (ref 0.57–1)
CREAT UR-MCNC: 100.5 MG/DL
EGFRCR SERPLBLD CKD-EPI 2021: 54.2 ML/MIN/1.73
GLUCOSE SERPL-MCNC: 165 MG/DL (ref 65–99)
HBA1C MFR BLD: 7.1 % (ref 4.8–5.6)
HCV IGG SERPL QL IA: NON REACTIVE
MICROALBUMIN UR-MCNC: 3.9 UG/ML
POTASSIUM SERPL-SCNC: 5.1 MMOL/L (ref 3.5–5.2)
SODIUM SERPL-SCNC: 130 MMOL/L (ref 136–145)

## 2023-08-28 ENCOUNTER — TELEPHONE (OUTPATIENT)
Dept: FAMILY MEDICINE CLINIC | Facility: CLINIC | Age: 78
End: 2023-08-28
Payer: MEDICARE

## 2023-08-28 NOTE — TELEPHONE ENCOUNTER
Caller: Sneha Cook    Relationship: Self    Best call back number: [phone]     What is the best time to reach you: ANY    Who are you requesting to speak with (clinical staff, provider,  specific staff member): CLINICAL STAFF    Do you know the name of the person who called:      What was the call regarding: PATIENT CALLED SHE IS HAVING SEVERE DIARRHEA AND WANTS TO SPEAK WITH DR GUAMAN ABOUT HER MEDICATION.  SHE IS ON METFORMIN  MG AND OZEMPIC BUT SHE HAS NOT TAKEN OZEMPIC OR METFORMIN TODAY.  SHE WILL NOT TAKE THE MEDICATION UNTIL SHE TALKS WITH DR GUAMAN.  SHE NEEDS TO KNOW IF SHE NEEDS TO CONTINUE WITH METFORMIN OR DISCONTINUE SINCE SHE IS ON OZEMPIC NOW.  PLEASE CALL HER BACK AS SOON AS POSSIBLE.      Is it okay if the provider responds through MyChart:

## 2023-08-28 NOTE — TELEPHONE ENCOUNTER
Called pt, discussed her concern  She is c/o diarrhea for 3 days . She is on on ozempic for 2 weeks , on MTF for many months.   I advised to hold on ozempic as I think her sx from ozempic not MTF.  Will monitor sx, restart on ozempic in few weeks, if diarrhea back so no more ozempic  She stated U/A with the plan  She appreciate the call.    Felix Barone MD

## 2023-08-29 ENCOUNTER — HOSPITAL ENCOUNTER (OUTPATIENT)
Dept: BONE DENSITY | Facility: HOSPITAL | Age: 78
Discharge: HOME | End: 2023-08-29
Admitting: FAMILY MEDICINE
Payer: MEDICARE

## 2023-08-29 DIAGNOSIS — Z78.0 MENOPAUSE: ICD-10-CM

## 2023-08-29 PROCEDURE — 77080 DXA BONE DENSITY AXIAL: CPT

## 2023-10-13 ENCOUNTER — OFFICE VISIT (OUTPATIENT)
Dept: FAMILY MEDICINE CLINIC | Facility: CLINIC | Age: 78
End: 2023-10-13
Payer: MEDICARE

## 2023-10-13 VITALS
OXYGEN SATURATION: 98 % | BODY MASS INDEX: 29.49 KG/M2 | TEMPERATURE: 97.7 F | HEIGHT: 65 IN | SYSTOLIC BLOOD PRESSURE: 126 MMHG | DIASTOLIC BLOOD PRESSURE: 86 MMHG | HEART RATE: 75 BPM | RESPIRATION RATE: 16 BRPM | WEIGHT: 177 LBS

## 2023-10-13 DIAGNOSIS — E89.0 POSTOPERATIVE HYPOTHYROIDISM: ICD-10-CM

## 2023-10-13 DIAGNOSIS — E78.5 DYSLIPIDEMIA: ICD-10-CM

## 2023-10-13 DIAGNOSIS — R41.3 MEMORY DISORDER: ICD-10-CM

## 2023-10-13 DIAGNOSIS — Z23 NEED FOR PROPHYLACTIC VACCINATION AGAINST STREPTOCOCCUS PNEUMONIAE (PNEUMOCOCCUS): ICD-10-CM

## 2023-10-13 DIAGNOSIS — K21.9 CHRONIC GERD: ICD-10-CM

## 2023-10-13 DIAGNOSIS — Z23 FLU VACCINE NEED: ICD-10-CM

## 2023-10-13 DIAGNOSIS — I10 PRIMARY HYPERTENSION: Primary | ICD-10-CM

## 2023-10-13 DIAGNOSIS — M48.062 SPINAL STENOSIS OF LUMBAR REGION WITH NEUROGENIC CLAUDICATION: ICD-10-CM

## 2023-10-13 DIAGNOSIS — E11.42 DM TYPE 2 WITH DIABETIC PERIPHERAL NEUROPATHY: ICD-10-CM

## 2023-10-13 DIAGNOSIS — R21 RASH: ICD-10-CM

## 2023-10-13 NOTE — PROGRESS NOTES
Subjective     Sneha Cook is a 78 y.o. female.     Chief Complaint   Patient presents with    Diabetes     2 month follow up. Patient is fasting.     Hypertension    Hyperlipidemia    Back Pain     Low back pain has got worst     Numbness     Both feet are numb.    memory issues     Would like to discuss prevagen medication.     Rash     Possible on left shoulder has been there for a while has spots also itchy.        History of Present Illness     She presents to f/u on the followings;     Chronic back pain at the Lumbosacral area with Spinal stenosis.   She has tingling and numbnbess in toes . No weakness.   She has back pain radiating to feet.   Epidural in past helped somewhat.   Had s/e from Micaela, so stopped taking it.  Pain worsening 5-8/10  On shock therapy for the feet weekly for 6-8 weeks , had 2 so far   The numbness affecting all her feet and legs     Rash at the lt scapula for > 2 weeks , itchy , sx improving     HTN; no new concern , no S/E from Rx, on HD low in salt    Memory disorder got worse , always write things to remember   Asking about Prevagen     DM type 2 complicated with PN ; On HD low in carbs, doing well on MTF  Stopped taking ozempic due to GI s/e     Hypothyroidism s/p thyroidectomy; Doing well on Levothyr 50 mcg.     HLD; eats HD , doing well on statin   Last Lipid showed normal TC/LDL with very good level of HDL    GERD;  well controlled with current Rx, no S/E reported.   Not smoker   Dose not drinks alcohol    Lab Results   Component Value Date    HGBA1C 7.10 (H) 08/16/2023     Lab Results   Component Value Date    TSH 0.951 02/23/2023       Lab Results   Component Value Date    CHLPL 177 02/23/2023    TRIG 83 02/23/2023    HDL 90 (H) 02/23/2023    LDL 72 02/23/2023           The following portions of the patient's history were reviewed and updated as appropriate: allergies, current medications, past family history, past medical history, past social history, past surgical  history, and problem list.        Review of Systems   Constitutional:  Negative for diaphoresis, fatigue and fever.   Respiratory:  Negative for shortness of breath.    Cardiovascular:  Negative for chest pain and palpitations.   Gastrointestinal:  Negative for abdominal pain, nausea and vomiting.   Genitourinary:  Negative for urinary incontinence.   Musculoskeletal:  Positive for back pain. Negative for neck pain.   Neurological:  Negative for dizziness, syncope, light-headedness and confusion.       Vitals:    10/13/23 0830   BP: 126/86   Pulse: 75   Resp: 16   Temp: 97.7 øF (36.5 øC)   SpO2: 98%           10/13/23  0830   Weight: 80.3 kg (177 lb)         Body mass index is 29.45 kg/mý.      Current Outpatient Medications   Medication Sig Dispense Refill    levothyroxine (SYNTHROID, LEVOTHROID) 50 MCG tablet Take 1 tablet by mouth Daily. 90 tablet 1    losartan (COZAAR) 100 MG tablet Take 1 tablet by mouth Daily. 90 tablet 1    Magnesium 400 MG capsule Take  by mouth.      metFORMIN ER (GLUCOPHAGE-XR) 750 MG 24 hr tablet Take 1 tablet by mouth Daily With Breakfast. 30 tablet 0    Omega-3 Fatty Acids (fish oil) 1000 MG capsule capsule Take  by mouth Daily With Breakfast.      pantoprazole (PROTONIX) 40 MG EC tablet Take 1 tablet by mouth Daily. 90 tablet 1    simvastatin (ZOCOR) 40 MG tablet Take 1 tablet by mouth Every Night. 90 tablet 1    terbinafine (lamiSIL) 250 MG tablet Take 1 tablet by mouth Daily.       No current facility-administered medications for this visit.                Objective   Physical Exam  Vitals and nursing note reviewed.   Constitutional:       General: She is not in acute distress.     Appearance: She is not ill-appearing, toxic-appearing or diaphoretic.   Neck:      Comments: No enlarged thyroid      Cardiovascular:      Rate and Rhythm: Normal rate and regular rhythm.      Heart sounds: Normal heart sounds. No murmur heard.  Pulmonary:      Effort: Pulmonary effort is normal. No  respiratory distress.      Breath sounds: Normal breath sounds. No stridor. No wheezing or rhonchi.   Musculoskeletal:      Cervical back: Neck supple.   Neurological:      Mental Status: She is alert and oriented to person, place, and time.   Psychiatric:         Mood and Affect: Mood normal.         Behavior: Behavior normal.         Thought Content: Thought content normal.           Assessment & Plan   Diagnoses and all orders for this visit:    1. Primary hypertension (Primary)  Comments:  Stable, continue current Rx    2. DM type 2 with diabetic peripheral neuropathy  Comments:  Stable, continue current Rx    3. Dyslipidemia  Comments:  Stable, continue current Rx    4. Memory disorder  Comments:  mild , close monitoring    5. Need for prophylactic vaccination against Streptococcus pneumoniae (pneumococcus)  -     Pneumococcal Conjugate Vaccine 20-Valent (PCV20)    6. Flu vaccine need  -     Fluzone High-Dose 65+yrs    7. Postoperative hypothyroidism  Comments:  Stable, continue current Rx    8. Chronic GERD  Comments:  Stable, continue current Rx    9. Rash  Comments:  resolved    10. Spinal stenosis of lumbar region with neurogenic claudication  Comments:  continue current plan of Rx        Patient was given instructions and counseling regarding her condition or for health maintenance advice.   Please see specific information pulled into the AVS if appropriate.       I have fully discussed the nature of the medical condition(s) risks, complications, management, safe and proper use of medications.   Encouraged medication compliance and the importance of keeping scheduled follow up appointments with me and any other providers.    Patient instructed to follow up with our office for results on any labs/imaging ordered during this visit.    Home care discussed  All questions answered  Patient verbalizes understanding and agrees to treatment plan.     Follow up: Return in about 4 months (around 2/13/2024) for  follow up on current illness.

## 2023-10-17 ENCOUNTER — TELEPHONE (OUTPATIENT)
Dept: FAMILY MEDICINE CLINIC | Facility: CLINIC | Age: 78
End: 2023-10-17
Payer: MEDICARE

## 2023-10-17 NOTE — TELEPHONE ENCOUNTER
Name: Sneha Cook  Relationship: Self  Best Callback Number: [phone]     HUB PROVIDED THE RELAY MESSAGE FROM THE OFFICE

## 2023-10-17 NOTE — TELEPHONE ENCOUNTER
HUB authorized to relay: left vm, sent records to [facility] for  to review.

## 2023-11-03 ENCOUNTER — OFFICE VISIT (OUTPATIENT)
Dept: FAMILY MEDICINE CLINIC | Facility: CLINIC | Age: 78
End: 2023-11-03
Payer: MEDICARE

## 2023-11-03 VITALS
OXYGEN SATURATION: 97 % | HEART RATE: 85 BPM | BODY MASS INDEX: 29.49 KG/M2 | RESPIRATION RATE: 16 BRPM | HEIGHT: 65 IN | SYSTOLIC BLOOD PRESSURE: 126 MMHG | TEMPERATURE: 97.1 F | DIASTOLIC BLOOD PRESSURE: 86 MMHG | WEIGHT: 177 LBS

## 2023-11-03 DIAGNOSIS — E11.9 DM TYPE 2, GOAL HBA1C < 7%: ICD-10-CM

## 2023-11-03 DIAGNOSIS — B35.1 ONYCHOMYCOSIS: ICD-10-CM

## 2023-11-03 DIAGNOSIS — L03.011 CELLULITIS OF FINGER OF RIGHT HAND: Primary | ICD-10-CM

## 2023-11-03 DIAGNOSIS — E11.42 DIABETIC POLYNEUROPATHY ASSOCIATED WITH TYPE 2 DIABETES MELLITUS: ICD-10-CM

## 2023-11-03 RX ORDER — DULOXETIN HYDROCHLORIDE 20 MG/1
20 CAPSULE, DELAYED RELEASE ORAL DAILY
Qty: 30 CAPSULE | Refills: 3 | Status: SHIPPED | OUTPATIENT
Start: 2023-11-03

## 2023-11-03 RX ORDER — CEPHALEXIN 500 MG/1
500 CAPSULE ORAL 2 TIMES DAILY
Qty: 14 CAPSULE | Refills: 0 | Status: SHIPPED | OUTPATIENT
Start: 2023-11-03 | End: 2023-11-10

## 2023-11-03 NOTE — PROGRESS NOTES
Subjective     Sneha Cook is a 78 y.o. female.     Chief Complaint   Patient presents with    Edema     Finger red also 3rd finger right hand painful and hard notice yesterday     toe nail fungal infection    Numbness       History of Present Illness     She is c/o pain of the Rt middle finger with swelling, looks red.   No h/o direct trauma     Has DM type 2 , last A1c was 7.1   Eats HD   Had bilateral foot numbness for ~ 1 year, constant , sx got worse lately   Following with Chiropractor , doing message /electrical waves , it is very expensive     Lab Results   Component Value Date    HGBA1C 7.10 (H) 08/16/2023     Has Fungal toe infection for many months, on Rx , her nails looks better      The following portions of the patient's history were reviewed and updated as appropriate: allergies, current medications, past family history, past medical history, past social history, past surgical history, and problem list.        Review of Systems   Skin:  Positive for color change.       Vitals:    11/03/23 1327   BP: 126/86   Pulse: 85   Resp: 16   Temp: 97.1 °F (36.2 °C)   SpO2: 97%           11/03/23  1327   Weight: 80.3 kg (177 lb)         Body mass index is 29.45 kg/m².      Current Outpatient Medications   Medication Sig Dispense Refill    levothyroxine (SYNTHROID, LEVOTHROID) 50 MCG tablet Take 1 tablet by mouth Daily. 90 tablet 1    losartan (COZAAR) 100 MG tablet Take 1 tablet by mouth Daily. 90 tablet 1    Magnesium 400 MG capsule Take  by mouth.      metFORMIN ER (GLUCOPHAGE-XR) 750 MG 24 hr tablet Take 1 tablet by mouth Daily With Breakfast. 30 tablet 0    Omega-3 Fatty Acids (fish oil) 1000 MG capsule capsule Take  by mouth Daily With Breakfast.      pantoprazole (PROTONIX) 40 MG EC tablet Take 1 tablet by mouth Daily. 90 tablet 1    simvastatin (ZOCOR) 40 MG tablet Take 1 tablet by mouth Every Night. 90 tablet 1    terbinafine (lamiSIL) 250 MG tablet Take 1 tablet by mouth Daily.      cephalexin  (Keflex) 500 MG capsule Take 1 capsule by mouth 2 (Two) Times a Day for 7 days. 14 capsule 0    DULoxetine (Cymbalta) 20 MG capsule Take 1 capsule by mouth Daily. 30 capsule 3     No current facility-administered medications for this visit.                Objective   Physical Exam  Vitals and nursing note reviewed.   Constitutional:       General: She is not in acute distress.     Appearance: She is not toxic-appearing.   Musculoskeletal:         General: Swelling and tenderness present.      Comments: Swelling at the tip of the Rt middle finger with redness and TTP   Skin:     Comments: Toe nail yellow discoloration and thicking improved    Neurological:      Mental Status: She is alert and oriented to person, place, and time.   Psychiatric:         Mood and Affect: Mood normal.         Behavior: Behavior normal.           Assessment & Plan   Diagnoses and all orders for this visit:    1. Cellulitis of finger of right hand (Primary)  -     cephalexin (Keflex) 500 MG capsule; Take 1 capsule by mouth 2 (Two) Times a Day for 7 days.  Dispense: 14 capsule; Refill: 0    2. Diabetic polyneuropathy associated with type 2 diabetes mellitus  Comments:  will start on Rx  close monitoring  Orders:  -     DULoxetine (Cymbalta) 20 MG capsule; Take 1 capsule by mouth Daily.  Dispense: 30 capsule; Refill: 3    3. DM type 2, goal HbA1c < 7%  Comments:  close to goal  continue curent Rx + HD    4. Onychomycosis  Comments:  sx improving        Patient was given instructions and counseling regarding her condition or for health maintenance advice.   Please see specific information pulled into the AVS if appropriate.       I have fully discussed the nature of the medical condition(s) risks, complications, management, safe and proper use of medications.   Pt stated no allergy to the above prescribed medication.  I have discussed the SIDE EFFECT OF MEDICATION and importance TO report any side effect , the patient expressed good  understanding.  Encouraged medication compliance and the importance of keeping scheduled follow up appointments with me and any other providers.    Patient instructed to follow up with our office for results on any labs/imaging ordered during this visit.    Home care discussed  All questions answered  Patient verbalizes understanding and agrees to treatment plan.     Follow up: Return for if no better or worsening symptoms.

## 2023-12-26 DIAGNOSIS — E03.9 HYPOTHYROIDISM, UNSPECIFIED TYPE: ICD-10-CM

## 2023-12-26 DIAGNOSIS — E78.5 HYPERLIPIDEMIA, UNSPECIFIED HYPERLIPIDEMIA TYPE: ICD-10-CM

## 2023-12-26 RX ORDER — LEVOTHYROXINE SODIUM 50 MCG
50 TABLET ORAL DAILY
Qty: 90 TABLET | Refills: 1 | Status: SHIPPED | OUTPATIENT
Start: 2023-12-26

## 2023-12-26 RX ORDER — PANTOPRAZOLE SODIUM 40 MG/1
40 TABLET, DELAYED RELEASE ORAL DAILY
Qty: 90 TABLET | Refills: 1 | Status: SHIPPED | OUTPATIENT
Start: 2023-12-26

## 2023-12-26 RX ORDER — SIMVASTATIN 40 MG
40 TABLET ORAL NIGHTLY
Qty: 90 TABLET | Refills: 1 | Status: SHIPPED | OUTPATIENT
Start: 2023-12-26

## 2023-12-26 RX ORDER — LOSARTAN POTASSIUM 100 MG/1
100 TABLET ORAL DAILY
Qty: 90 TABLET | Refills: 1 | Status: SHIPPED | OUTPATIENT
Start: 2023-12-26

## 2024-02-15 ENCOUNTER — OFFICE VISIT (OUTPATIENT)
Dept: FAMILY MEDICINE CLINIC | Facility: CLINIC | Age: 79
End: 2024-02-15
Payer: MEDICARE

## 2024-02-15 VITALS
HEIGHT: 65 IN | TEMPERATURE: 97.1 F | RESPIRATION RATE: 16 BRPM | OXYGEN SATURATION: 97 % | SYSTOLIC BLOOD PRESSURE: 132 MMHG | DIASTOLIC BLOOD PRESSURE: 78 MMHG | BODY MASS INDEX: 29.66 KG/M2 | HEART RATE: 89 BPM | WEIGHT: 178 LBS

## 2024-02-15 DIAGNOSIS — R20.0 NUMBNESS AND TINGLING OF FOOT: ICD-10-CM

## 2024-02-15 DIAGNOSIS — E78.5 DYSLIPIDEMIA: ICD-10-CM

## 2024-02-15 DIAGNOSIS — E89.0 POSTOPERATIVE HYPOTHYROIDISM: ICD-10-CM

## 2024-02-15 DIAGNOSIS — E11.42 DM TYPE 2 WITH DIABETIC PERIPHERAL NEUROPATHY: Primary | ICD-10-CM

## 2024-02-15 DIAGNOSIS — K21.9 CHRONIC GERD: ICD-10-CM

## 2024-02-15 DIAGNOSIS — R20.2 NUMBNESS AND TINGLING OF FOOT: ICD-10-CM

## 2024-02-15 DIAGNOSIS — I10 PRIMARY HYPERTENSION: ICD-10-CM

## 2024-02-15 DIAGNOSIS — E11.9 DM TYPE 2, GOAL HBA1C < 7%: ICD-10-CM

## 2024-02-15 DIAGNOSIS — M48.062 SPINAL STENOSIS OF LUMBAR REGION WITH NEUROGENIC CLAUDICATION: ICD-10-CM

## 2024-02-15 DIAGNOSIS — B35.1 ONYCHOMYCOSIS: ICD-10-CM

## 2024-02-15 LAB
EXPIRATION DATE: ABNORMAL
HBA1C MFR BLD: 7.7 % (ref 4.5–5.7)
Lab: ABNORMAL

## 2024-02-15 RX ORDER — METFORMIN HYDROCHLORIDE 750 MG/1
750 TABLET, EXTENDED RELEASE ORAL 2 TIMES DAILY
Qty: 180 TABLET | Refills: 1 | Status: SHIPPED | OUTPATIENT
Start: 2024-02-15

## 2024-06-12 ENCOUNTER — OFFICE VISIT (OUTPATIENT)
Dept: FAMILY MEDICINE CLINIC | Facility: CLINIC | Age: 79
End: 2024-06-12
Payer: MEDICARE

## 2024-06-12 ENCOUNTER — TELEPHONE (OUTPATIENT)
Dept: FAMILY MEDICINE CLINIC | Facility: CLINIC | Age: 79
End: 2024-06-12

## 2024-06-12 VITALS
HEART RATE: 56 BPM | BODY MASS INDEX: 28.66 KG/M2 | TEMPERATURE: 96.9 F | RESPIRATION RATE: 16 BRPM | WEIGHT: 172 LBS | SYSTOLIC BLOOD PRESSURE: 122 MMHG | OXYGEN SATURATION: 98 % | DIASTOLIC BLOOD PRESSURE: 70 MMHG | HEIGHT: 65 IN

## 2024-06-12 DIAGNOSIS — E78.5 DYSLIPIDEMIA: ICD-10-CM

## 2024-06-12 DIAGNOSIS — M48.062 SPINAL STENOSIS OF LUMBAR REGION WITH NEUROGENIC CLAUDICATION: ICD-10-CM

## 2024-06-12 DIAGNOSIS — E53.8 VITAMIN B12 DEFICIENCY: ICD-10-CM

## 2024-06-12 DIAGNOSIS — E03.9 HYPOTHYROIDISM, UNSPECIFIED TYPE: ICD-10-CM

## 2024-06-12 DIAGNOSIS — G89.29 CHRONIC RADICULAR PAIN OF LOWER BACK: ICD-10-CM

## 2024-06-12 DIAGNOSIS — M54.16 CHRONIC RADICULAR PAIN OF LOWER BACK: ICD-10-CM

## 2024-06-12 DIAGNOSIS — B35.1 ONYCHOMYCOSIS: ICD-10-CM

## 2024-06-12 DIAGNOSIS — I10 PRIMARY HYPERTENSION: ICD-10-CM

## 2024-06-12 DIAGNOSIS — E11.42 DM TYPE 2 WITH DIABETIC PERIPHERAL NEUROPATHY: Primary | ICD-10-CM

## 2024-06-12 DIAGNOSIS — K21.9 CHRONIC GERD: ICD-10-CM

## 2024-06-12 DIAGNOSIS — E55.9 VITAMIN D DEFICIENCY: ICD-10-CM

## 2024-06-12 LAB
EXPIRATION DATE: ABNORMAL
HBA1C MFR BLD: 6.9 % (ref 4.5–5.7)
Lab: ABNORMAL

## 2024-06-12 PROCEDURE — 83036 HEMOGLOBIN GLYCOSYLATED A1C: CPT | Performed by: FAMILY MEDICINE

## 2024-06-12 PROCEDURE — 3044F HG A1C LEVEL LT 7.0%: CPT | Performed by: FAMILY MEDICINE

## 2024-06-12 PROCEDURE — 99215 OFFICE O/P EST HI 40 MIN: CPT | Performed by: FAMILY MEDICINE

## 2024-06-12 PROCEDURE — 1126F AMNT PAIN NOTED NONE PRSNT: CPT | Performed by: FAMILY MEDICINE

## 2024-06-12 PROCEDURE — 3074F SYST BP LT 130 MM HG: CPT | Performed by: FAMILY MEDICINE

## 2024-06-12 PROCEDURE — 1160F RVW MEDS BY RX/DR IN RCRD: CPT | Performed by: FAMILY MEDICINE

## 2024-06-12 PROCEDURE — 3078F DIAST BP <80 MM HG: CPT | Performed by: FAMILY MEDICINE

## 2024-06-12 PROCEDURE — 1159F MED LIST DOCD IN RCRD: CPT | Performed by: FAMILY MEDICINE

## 2024-06-12 RX ORDER — MELOXICAM 15 MG/1
15 TABLET ORAL DAILY PRN
Qty: 60 TABLET | Refills: 1 | Status: SHIPPED | OUTPATIENT
Start: 2024-06-12

## 2024-06-12 RX ORDER — LOSARTAN POTASSIUM 100 MG/1
100 TABLET ORAL DAILY
Qty: 90 TABLET | Refills: 1 | Status: SHIPPED | OUTPATIENT
Start: 2024-06-12

## 2024-06-12 RX ORDER — CYCLOBENZAPRINE HCL 10 MG
10 TABLET ORAL NIGHTLY PRN
Qty: 90 TABLET | Refills: 1 | Status: SHIPPED | OUTPATIENT
Start: 2024-06-12

## 2024-06-12 RX ORDER — CYCLOBENZAPRINE HCL 10 MG
10 TABLET ORAL NIGHTLY PRN
Qty: 90 TABLET | Refills: 1 | Status: SHIPPED | OUTPATIENT
Start: 2024-06-12 | End: 2024-06-12 | Stop reason: SDUPTHER

## 2024-06-12 RX ORDER — PANTOPRAZOLE SODIUM 40 MG/1
40 TABLET, DELAYED RELEASE ORAL DAILY
Qty: 90 TABLET | Refills: 1 | Status: SHIPPED | OUTPATIENT
Start: 2024-06-12

## 2024-06-12 RX ORDER — LEVOTHYROXINE SODIUM 0.05 MG/1
50 TABLET ORAL DAILY
Qty: 90 TABLET | Refills: 1 | Status: CANCELLED | OUTPATIENT
Start: 2024-06-12

## 2024-06-12 RX ORDER — TERBINAFINE HYDROCHLORIDE 250 MG/1
250 TABLET ORAL DAILY
Status: CANCELLED | OUTPATIENT
Start: 2024-06-12

## 2024-06-12 NOTE — TELEPHONE ENCOUNTER
Caller: Sneha Cook    Relationship to patient: Self    Best call back number: [phone]    Patient is needing:     WOULD LIKE A CALL TO DISCUSS PAST MRI AND XRAY'S THAT WERE DONE AT [facility] IN 2022

## 2024-06-12 NOTE — PROGRESS NOTES
Subjective     Snhea Cook is a 79 y.o. female.     Chief Complaint   Patient presents with    Back Pain     Lower back, can't bend or move. Discuss pain medication's. Discuss spinal MRI    Diabetes     Would like blood work, discuss vitamins, and how much metformin should pt take.     Hypertension    Hyperlipidemia       History of Present Illness     Came for f/u on the followings;    DM type 2 with PN;   Eats more HD , doing well on MTF BID  Walks daily 2-4 miles /day    last A1c was 7.1 ,7.7 , today 6.9     PN; c/o numbness of both feet ; cause PN from DM vs from back pain, following with pod.    HLD; as above eats HD , doing well on statin , no S/E reported     Vit b12 and vit d defi; was on suppl     Back pain; sever , worse   Stopped taking Rx  Worse at [facility]   Has arthritis all spine   Pain worse when she lays on her side , it interfere with her ADL   Was on M. Relaxant and mobic    Chronic radicular back pain with Spinal stenosis.   Sx worse lately that affects her daily activity   She has tingling and numbnbess in toes .   No weakness.   Had one time Epidural in past , helped somewhat.   Was on Micaela, stopped taking it due to S/E   Had PT , lilttle helped .  On Meloxicam PRN and M .relaxant QHS to help with sleep as well.   She was following with neurology Dr. Perez .   She had MRI done in 2022, new one done few months ago, not sure who ordered it.. I did not see any new MRI.     Hypothyroidism s/p thyroidectomy;   Doing well on Levothyr 50 mcg. Due for labs before doing the refill       Lt big toe fungal infection ; completed 3 months with Lamisil , sx improved,        GERD; well controlled with current Rx/PRN, no S/E reported.       Insomnia; got worse krista, take M. Relaxant and mag to help    HTN;  well controlled with current Rx, no S/E reported.   On HD    MRI Lumbar Spine Wo Con (05/26/2022 14:59)     Lab Results   Component Value Date    HGBA1C 6.9 (A) 06/12/2024     Lab Results   Component  Value Date    TSH 0.951 02/23/2023     Lab Results   Component Value Date    CHLPL 177 02/23/2023    TRIG 83 02/23/2023    HDL 90 (H) 02/23/2023    LDL 72 02/23/2023     Lab Results   Component Value Date    GLUCOSE 165 (H) 08/16/2023    BUN 21 08/16/2023    CREATININE 1.05 (H) 08/16/2023    EGFRRESULT 54.2 (L) 08/16/2023    BCR 20.0 08/16/2023    K 5.1 08/16/2023    CO2 27.2 08/16/2023    CALCIUM 10.1 08/16/2023    PROTENTOTREF 6.5 02/23/2023    ALBUMIN 4.4 02/23/2023    BILITOT 0.3 02/23/2023    AST 23 02/23/2023    ALT 16 02/23/2023           The following portions of the patient's history were reviewed and updated as appropriate: allergies, current medications, past family history, past medical history, past social history, past surgical history, and problem list.        Review of Systems   Musculoskeletal:  Positive for back pain, gait problem and myalgias.       Vitals:    06/12/24 1056   BP: 122/70   Pulse: 56   Resp: 16   Temp: 96.9 °F (36.1 °C)   SpO2: 98%           06/12/24  1056   Weight: 78 kg (172 lb)         Body mass index is 28.62 kg/m².      Current Outpatient Medications   Medication Sig Dispense Refill    losartan (COZAAR) 100 MG tablet Take 1 tablet by mouth Daily. 90 tablet 1    Magnesium 400 MG capsule Take  by mouth.      metFORMIN ER (GLUCOPHAGE-XR) 750 MG 24 hr tablet Take 1 tablet by mouth 2 (Two) Times a Day. (Patient taking differently: Take 1 tablet by mouth Daily. Take one tablet a day.) 180 tablet 1    Omega-3 Fatty Acids (fish oil) 1000 MG capsule capsule Take  by mouth Daily With Breakfast.      pantoprazole (PROTONIX) 40 MG EC tablet Take 1 tablet by mouth Daily. 90 tablet 1    simvastatin (ZOCOR) 40 MG tablet TAKE 1 TABLET EVERY NIGHT 90 tablet 1    Synthroid 50 MCG tablet TAKE 1 TABLET DAILY 90 tablet 1    terbinafine (lamiSIL) 250 MG tablet Take 1 tablet by mouth Daily.      cyclobenzaprine (FLEXERIL) 10 MG tablet Take 1 tablet by mouth At Night As Needed for Muscle Spasms. 90  tablet 1    meloxicam (Mobic) 15 MG tablet Take 1 tablet by mouth Daily As Needed for Moderate Pain. 60 tablet 1     No current facility-administered medications for this visit.                Objective   Physical Exam  Vitals and nursing note reviewed.   Constitutional:       General: She is not in acute distress.     Appearance: She is not toxic-appearing.   Neck:      Comments: No enlarged thyroid      Cardiovascular:      Rate and Rhythm: Normal rate and regular rhythm.      Heart sounds: Normal heart sounds. No murmur heard.  Pulmonary:      Effort: Pulmonary effort is normal. No respiratory distress.      Breath sounds: Normal breath sounds. No stridor. No wheezing or rhonchi.   Musculoskeletal:         General: Tenderness present. No swelling.      Cervical back: Neck supple.   Skin:     Comments: Lt big toe fungal infection improved    Neurological:      General: No focal deficit present.      Mental Status: She is alert and oriented to person, place, and time.   Psychiatric:         Mood and Affect: Mood normal.         Behavior: Behavior normal.         Thought Content: Thought content normal.           Assessment & Plan   Diagnoses and all orders for this visit:    1. DM type 2 with diabetic peripheral neuropathy (Primary)  Comments:  improving , continue diet  dose of MTF decreased to one pill /day  Orders:  -     POC Glycosylated Hemoglobin (Hb A1C)  -     Comprehensive Metabolic Panel    2. Hypothyroidism, unspecified type  Comments:  Checking TSH  Will adjust dose accordingly  Orders:  -     TSH    3. Dyslipidemia  Comments:  due for labs, continue current Rx  Orders:  -     Lipid Panel    4. Primary hypertension  Comments:  Stable, continue current Rx  Orders:  -     losartan (COZAAR) 100 MG tablet; Take 1 tablet by mouth Daily.  Dispense: 90 tablet; Refill: 1  -     Comprehensive Metabolic Panel    5. Chronic GERD  Comments:  Stable, continue current Rx PRN  Orders:  -     pantoprazole (PROTONIX) 40  MG EC tablet; Take 1 tablet by mouth Daily.  Dispense: 90 tablet; Refill: 1    6. Onychomycosis  Comments:  no need Rx for now, sx improved , need just  trimming of nail    7. Spinal stenosis of lumbar region with neurogenic claudication  Comments:  2/2 worsening sx, needs to see NS  she will call dr Perez office to see if tehre is new MRI  Orders:  -     Ambulatory Referral to Neurosurgery  -     meloxicam (Mobic) 15 MG tablet; Take 1 tablet by mouth Daily As Needed for Moderate Pain.  Dispense: 60 tablet; Refill: 1  -     cyclobenzaprine (FLEXERIL) 10 MG tablet; Take 1 tablet by mouth At Night As Needed for Muscle Spasms.  Dispense: 90 tablet; Refill: 1    8. Chronic radicular pain of lower back  Comments:  as above  Orders:  -     Ambulatory Referral to Neurosurgery  -     meloxicam (Mobic) 15 MG tablet; Take 1 tablet by mouth Daily As Needed for Moderate Pain.  Dispense: 60 tablet; Refill: 1  -     cyclobenzaprine (FLEXERIL) 10 MG tablet; Take 1 tablet by mouth At Night As Needed for Muscle Spasms.  Dispense: 90 tablet; Refill: 1    9. Vitamin D deficiency  Comments:  due for labs  Orders:  -     Vitamin D,25-Hydroxy    10. Vitamin B12 deficiency  Comments:  due for labs  Orders:  -     Vitamin B12        I spent 52 minutes caring for this patient on this date of service. This time includes time spent by me in the following activities:preparing for the visit,reviewing previous medical records,  performing a medically appropriate examination and/or evaluation, counseling and educating the patient/family/caregiver, and documenting information in the medical record.           Patient was given instructions and counseling regarding her condition or for health maintenance advice. Please see specific information pulled into the AVS if appropriate.       I have fully discussed the nature of the medical condition(s) risks, complications, management, safe and proper use of medications.   Pt stated no allergy to the above  prescribed medication.  I have discussed the SIDE EFFECT OF MEDICATION and importance TO report any side effect , the patient expressed good understanding.  Encouraged medication compliance and the importance of keeping scheduled follow up appointments with me and any other providers.    Patient instructed to follow up with our office for results on any labs/imaging ordered during this visit.    Home care discussed  All questions answered  Patient verbalizes understanding and agrees to treatment plan.     Follow up: Return in about 2 months (around 8/12/2024) for medicare wellness.

## 2024-06-12 NOTE — TELEPHONE ENCOUNTER
Pt is asking about other medication    Meloxicam and cyclobenzaprim     They are not listed on her med list and no medication has been sent in    If we send in any medication-can we send a month supply to [facility] and the 90 day to [facility],     Please advise

## 2024-06-13 LAB
25(OH)D3+25(OH)D2 SERPL-MCNC: 25.8 NG/ML (ref 30–100)
ALBUMIN SERPL-MCNC: 4.4 G/DL (ref 3.5–5.2)
ALBUMIN/GLOB SERPL: 2.1 G/DL
ALP SERPL-CCNC: 86 U/L (ref 39–117)
ALT SERPL-CCNC: 11 U/L (ref 1–33)
AST SERPL-CCNC: 18 U/L (ref 1–32)
BILIRUB SERPL-MCNC: 0.4 MG/DL (ref 0–1.2)
BUN SERPL-MCNC: 19 MG/DL (ref 8–23)
BUN/CREAT SERPL: 20.7 (ref 7–25)
CALCIUM SERPL-MCNC: 9.4 MG/DL (ref 8.6–10.5)
CHLORIDE SERPL-SCNC: 97 MMOL/L (ref 98–107)
CHOLEST SERPL-MCNC: 248 MG/DL (ref 0–200)
CO2 SERPL-SCNC: 28.5 MMOL/L (ref 22–29)
CREAT SERPL-MCNC: 0.92 MG/DL (ref 0.57–1)
EGFRCR SERPLBLD CKD-EPI 2021: 63.1 ML/MIN/1.73
GLOBULIN SER CALC-MCNC: 2.1 GM/DL
GLUCOSE SERPL-MCNC: 134 MG/DL (ref 65–99)
HDLC SERPL-MCNC: 91 MG/DL (ref 40–60)
LDLC SERPL CALC-MCNC: 137 MG/DL (ref 0–100)
POTASSIUM SERPL-SCNC: 4.8 MMOL/L (ref 3.5–5.2)
PROT SERPL-MCNC: 6.5 G/DL (ref 6–8.5)
SODIUM SERPL-SCNC: 134 MMOL/L (ref 136–145)
TRIGL SERPL-MCNC: 119 MG/DL (ref 0–150)
TSH SERPL DL<=0.005 MIU/L-ACNC: 1.07 UIU/ML (ref 0.27–4.2)
VIT B12 SERPL-MCNC: 954 PG/ML (ref 211–946)
VLDLC SERPL CALC-MCNC: 20 MG/DL (ref 5–40)

## 2024-06-13 RX ORDER — ERGOCALCIFEROL 1.25 MG/1
50000 CAPSULE ORAL WEEKLY
Qty: 12 CAPSULE | Refills: 0 | Status: SHIPPED | OUTPATIENT
Start: 2024-06-13

## 2024-06-14 ENCOUNTER — TELEPHONE (OUTPATIENT)
Dept: FAMILY MEDICINE CLINIC | Facility: CLINIC | Age: 79
End: 2024-06-14
Payer: MEDICARE

## 2024-06-14 NOTE — TELEPHONE ENCOUNTER
Caller: Sneha Cook    Relationship: Self    Best call back number: [phone]    What is the best time to reach you: ANY     Who are you requesting to speak with (clinical staff, provider,  specific staff member): PROVIDER    Do you know the name of the person who called: SUDHAKAR     What was the call regarding: PATIENT A CALL REGARDING A REFERRAL TO ON ORTHOPEDIC SURGEON BY THE NAME BEN YAP. PLEASE ALL PATIENT TO DISCUSS. PATIENT WOULD LIKE APPROVAL FROM DR. GUAMAN BEFORE PROCEEDING.    Is it okay if the provider responds through [employer]: NO

## 2024-06-17 DIAGNOSIS — M54.16 CHRONIC RADICULAR PAIN OF LOWER BACK: Primary | ICD-10-CM

## 2024-06-17 DIAGNOSIS — R20.2 NUMBNESS AND TINGLING OF FOOT: ICD-10-CM

## 2024-06-17 DIAGNOSIS — R20.0 NUMBNESS AND TINGLING OF FOOT: ICD-10-CM

## 2024-06-17 DIAGNOSIS — M48.062 SPINAL STENOSIS OF LUMBAR REGION WITH NEUROGENIC CLAUDICATION: ICD-10-CM

## 2024-06-17 DIAGNOSIS — G89.29 CHRONIC RADICULAR PAIN OF LOWER BACK: Primary | ICD-10-CM

## 2024-06-17 NOTE — TELEPHONE ENCOUNTER
1.  Patient is to continue his current diet, medication and activity. 2.  Patient has received his Covid vaccines. 3.  I will see the patient back in 3 months with blood tests, urinalysis and EKG for his annual physical examination.   4.  I will see the p yes

## 2024-06-18 ENCOUNTER — TELEPHONE (OUTPATIENT)
Dept: FAMILY MEDICINE CLINIC | Facility: CLINIC | Age: 79
End: 2024-06-18
Payer: MEDICARE

## 2024-06-18 NOTE — TELEPHONE ENCOUNTER
Caller: Sneha Cook    Relationship: Self    Best call back number: [phone]     What is the best time to reach you: ANYTIME    Who are you requesting to speak with (clinical staff, provider,  specific staff member): MA FOR DR GUAMAN    What was the call regarding: PATIENT STATED SHE RECEIVED A CALL REGARDING EITHER MRI SCHEDULING OR HAVING BEEN SCHEDULED FOR REFERRAL WITH DR YAP, HOWEVER SHE WAS PREOCCUPIED AND DID NOT CATCH ALL OF WHAT WAS SAID.    PATIENT IS REQUESTING TO KNOW IF SHE HAS BE SCHEDULED FOR ONE OR BOTH OF THESE THINGS.    PLEASE CALL ASAP TO ADVISE.    Is it okay if the provider responds through [employer]: NO, PLEASE CALL PATIENT IS HAVING ISSUES WITH [employer].

## 2024-06-19 NOTE — TELEPHONE ENCOUNTER
I did not see any things scheduled in her chart except one with me..  She can call the numbers that they called her

## 2024-06-19 NOTE — TELEPHONE ENCOUNTER
Caller: Sneha Cook    Relationship: Self    Best call back number: [phone]     What is the best time to reach you: ANY    Who are you requesting to speak with (clinical staff, provider,  specific staff member): CLINICAL STAFF    Do you know the name of the person who called:      What was the call regarding: PATIENT CALLED AND STATED SHE WAS INQUIRING ABOUT THE MRI AND REFERRAL FOR APPT WITH DR YAP.  SHE WANTS THE MRI DONE AT THE SAME PLACE SHE WENT TO IN MAY 2022.  PLEASE CALL AND LET HER KNOW WHERE THAT WAS AND UPDATE ON THE REFERRAL FOR DR YAP FOR THE APPT.  SHE HAD CALLED DR YAP OFFICE YESTERDAY AND THEY HAVE NOT HEARD ANYTHING.      Is it okay if the provider responds through MyChart:

## 2024-06-20 NOTE — TELEPHONE ENCOUNTER
PATIENT CALLED BACK AND WANTED TO MAKE SURE THE CORRECT FAX NUMBER FOR DR. YAP WAS USED.   [phone] FAX NUMBER    CALL BACK NUMBER [phone]

## 2024-06-21 ENCOUNTER — TELEPHONE (OUTPATIENT)
Dept: FAMILY MEDICINE CLINIC | Facility: CLINIC | Age: 79
End: 2024-06-21
Payer: MEDICARE

## 2024-06-21 DIAGNOSIS — G89.29 CHRONIC RADICULAR PAIN OF LOWER BACK: ICD-10-CM

## 2024-06-21 DIAGNOSIS — K21.9 CHRONIC GERD: ICD-10-CM

## 2024-06-21 DIAGNOSIS — M48.062 SPINAL STENOSIS OF LUMBAR REGION WITH NEUROGENIC CLAUDICATION: ICD-10-CM

## 2024-06-21 DIAGNOSIS — I10 PRIMARY HYPERTENSION: ICD-10-CM

## 2024-06-21 DIAGNOSIS — M54.16 CHRONIC RADICULAR PAIN OF LOWER BACK: ICD-10-CM

## 2024-06-21 RX ORDER — LOSARTAN POTASSIUM 100 MG/1
100 TABLET ORAL DAILY
Qty: 90 TABLET | Refills: 1 | Status: SHIPPED | OUTPATIENT
Start: 2024-06-21

## 2024-06-21 RX ORDER — TERBINAFINE HYDROCHLORIDE 250 MG/1
250 TABLET ORAL DAILY
Qty: 90 TABLET | Refills: 0 | Status: SHIPPED | OUTPATIENT
Start: 2024-06-21 | End: 2024-06-21 | Stop reason: SDUPTHER

## 2024-06-21 RX ORDER — TERBINAFINE HYDROCHLORIDE 250 MG/1
250 TABLET ORAL DAILY
Qty: 7 TABLET | Refills: 0 | Status: SHIPPED | OUTPATIENT
Start: 2024-06-21

## 2024-06-21 RX ORDER — PANTOPRAZOLE SODIUM 40 MG/1
40 TABLET, DELAYED RELEASE ORAL DAILY
Qty: 90 TABLET | Refills: 1 | Status: SHIPPED | OUTPATIENT
Start: 2024-06-21

## 2024-06-21 RX ORDER — MELOXICAM 15 MG/1
15 TABLET ORAL DAILY PRN
Qty: 30 TABLET | Refills: 0 | Status: SHIPPED | OUTPATIENT
Start: 2024-06-21

## 2024-06-21 NOTE — TELEPHONE ENCOUNTER
Pt is requesting   meloxicam (Mobic) 15 MG tablet a 30 day supply to [facility] and a 90 day to [facility] If possible

## 2024-06-21 NOTE — TELEPHONE ENCOUNTER
Caller: Sneha Cook    Relationship: Self    Best call back number: [phone]    What medication are you requesting:   terbinafine (lamiSIL) 250 MG tablet   --90 DAY PRESCRIPTION  Have you had these symptoms before:    [x] Yes  [] No    Have you been treated for these symptoms before:   [x] Yes  [] No    If a prescription is needed, what is your preferred pharmacy and phone number: [facility], [facility]. [address] [phone]  - [phone]      Additional notes:

## 2024-06-21 NOTE — TELEPHONE ENCOUNTER
Please fill while PCP is out of the office  Pt is requesting 1W supply be sent to local pharmacy as mail order pharmacy has not yet processed rx  Rx Refill Note  Requested Prescriptions     Pending Prescriptions Disp Refills    terbinafine (lamiSIL) 250 MG tablet 7 tablet 0     Sig: Take 1 tablet by mouth Daily.     Signed Prescriptions Disp Refills    losartan (COZAAR) 100 MG tablet 90 tablet 1     Sig: Take 1 tablet by mouth Daily.     Authorizing Provider: LEIGH ANN GUAMAN     Ordering User: TREVOR TAVARES    pantoprazole (PROTONIX) 40 MG EC tablet 90 tablet 1     Sig: Take 1 tablet by mouth Daily.     Authorizing Provider: LEIGH ANN GUAMAN     Ordering User: TREVOR TAVARES      Last office visit with prescribing clinician: 6/12/2024   Last telemedicine visit with prescribing clinician: Visit date not found   Next office visit with prescribing clinician: 6/21/2024                         Would you like a call back once the refill request has been completed: [] Yes [] No    If the office needs to give you a call back, can they leave a voicemail: [] Yes [] No    Yanet Trejo CMA/LMR  06/21/24, 16:09 EDT

## 2024-06-21 NOTE — TELEPHONE ENCOUNTER
Please fill while PCP is out of the office  Rx Refill Note  Requested Prescriptions      No prescriptions requested or ordered in this encounter      Last office visit with prescribing clinician: 6/12/2024   Last telemedicine visit with prescribing clinician: Visit date not found   Next office visit with prescribing clinician: 8/13/2024                         Would you like a call back once the refill request has been completed: [] Yes [] No    If the office needs to give you a call back, can they leave a voicemail: [] Yes [] No    Yanet Trejo CMA/LMR  06/21/24, 16:07 EDT

## 2024-06-21 NOTE — TELEPHONE ENCOUNTER
Caller: Sneha Cook    Relationship to patient: Self    Best call back number: [phone]    Patient is needing:     ASKING FOR PRESCRIPTIONS:     losartan (COZAAR) 100 MG tablet       pantoprazole (PROTONIX) 40 MG EC tablet     CHANGED TO GO TO:     [facility], [address] [phone] [facility] [phone]

## 2024-06-21 NOTE — TELEPHONE ENCOUNTER
Pt requesting a temp supply of terbinafine (lamiSIL) 250 MG tablet  to be sent to [facility] ASAP

## 2024-06-24 ENCOUNTER — TELEPHONE (OUTPATIENT)
Dept: FAMILY MEDICINE CLINIC | Facility: CLINIC | Age: 79
End: 2024-06-24
Payer: MEDICARE

## 2024-06-24 NOTE — TELEPHONE ENCOUNTER
Patient stated that Dr. Fuentes will not see her due to pt went to [facility] spine 2-3 years ago, Dr. Fuentes won't see her due to that. Please place another referral to Neuro somewhere close to her area pt don't want to travel downtown. Also will pt need another referral for MRI due to this situation?

## 2024-06-24 NOTE — TELEPHONE ENCOUNTER
Caller: Sneha Cook    Relationship to patient: Self    Best call back number: [phone]    Patient is needing:     WOULD LIKE A CALLBACK TO DISCUSS THE REFERRAL TO DR. YAP AND THE MRI THAT WAS ORDERED FOR HER.

## 2024-06-25 DIAGNOSIS — R20.0 NUMBNESS AND TINGLING OF FOOT: ICD-10-CM

## 2024-06-25 DIAGNOSIS — G89.29 CHRONIC RADICULAR PAIN OF LOWER BACK: ICD-10-CM

## 2024-06-25 DIAGNOSIS — M48.062 SPINAL STENOSIS OF LUMBAR REGION WITH NEUROGENIC CLAUDICATION: Primary | ICD-10-CM

## 2024-06-25 DIAGNOSIS — M54.16 CHRONIC RADICULAR PAIN OF LOWER BACK: ICD-10-CM

## 2024-06-25 DIAGNOSIS — R20.2 NUMBNESS AND TINGLING OF FOOT: ICD-10-CM

## 2024-06-25 NOTE — TELEPHONE ENCOUNTER
Caller: Sneha Cook    Relationship: Self    Best call back number: [phone]      Who are you requesting to speak with (clinical staff, provider,  specific staff member): CLINICAL       What was the call regarding: REQUESTS UPDATE ON REFERRAL AND MRI ORDER  PLEASE ADVISE

## 2024-06-27 NOTE — TELEPHONE ENCOUNTER
Spoke with pt regarding MRI and Neuro referral pt stated she has been calling both office for appointment for 9 days now, stated she is frustrated and needs to have her appt schedule I have provided pt phone numbers to both offices, please advise. She will like a call back regarding referrals

## 2024-07-01 ENCOUNTER — TELEPHONE (OUTPATIENT)
Dept: FAMILY MEDICINE CLINIC | Facility: CLINIC | Age: 79
End: 2024-07-01
Payer: MEDICARE

## 2024-07-01 NOTE — TELEPHONE ENCOUNTER
Caller: Sneha Cook    Relationship: Self    Best call back number: [phone]     What was the call regarding: PATIENT WAS ABLE TO GET HER LUMBAR SPINE MRI SCHEDULED AT [facility] BUT IS WANTING TO MAKE SURE THIS MRI WAS APPROVED BY HER INSURANCE    PLEASE ADVISE

## 2024-07-08 DIAGNOSIS — I10 PRIMARY HYPERTENSION: ICD-10-CM

## 2024-07-08 DIAGNOSIS — E78.5 HYPERLIPIDEMIA, UNSPECIFIED HYPERLIPIDEMIA TYPE: ICD-10-CM

## 2024-07-08 DIAGNOSIS — M48.062 SPINAL STENOSIS OF LUMBAR REGION WITH NEUROGENIC CLAUDICATION: ICD-10-CM

## 2024-07-08 DIAGNOSIS — E03.9 HYPOTHYROIDISM, UNSPECIFIED TYPE: ICD-10-CM

## 2024-07-08 DIAGNOSIS — M54.16 CHRONIC RADICULAR PAIN OF LOWER BACK: ICD-10-CM

## 2024-07-08 DIAGNOSIS — K21.9 CHRONIC GERD: ICD-10-CM

## 2024-07-08 DIAGNOSIS — E11.9 DM TYPE 2, GOAL HBA1C < 7%: ICD-10-CM

## 2024-07-08 DIAGNOSIS — G89.29 CHRONIC RADICULAR PAIN OF LOWER BACK: ICD-10-CM

## 2024-07-08 RX ORDER — MELOXICAM 15 MG/1
15 TABLET ORAL DAILY PRN
Qty: 30 TABLET | Refills: 0 | Status: SHIPPED | OUTPATIENT
Start: 2024-07-08

## 2024-07-08 RX ORDER — METFORMIN HYDROCHLORIDE 750 MG/1
750 TABLET, EXTENDED RELEASE ORAL DAILY
Qty: 90 TABLET | Refills: 0 | Status: SHIPPED | OUTPATIENT
Start: 2024-07-08

## 2024-07-08 RX ORDER — SIMVASTATIN 40 MG
40 TABLET ORAL NIGHTLY
Qty: 90 TABLET | Refills: 1 | Status: SHIPPED | OUTPATIENT
Start: 2024-07-08

## 2024-07-08 RX ORDER — CYCLOBENZAPRINE HCL 10 MG
10 TABLET ORAL NIGHTLY PRN
Qty: 90 TABLET | Refills: 1 | Status: SHIPPED | OUTPATIENT
Start: 2024-07-08

## 2024-07-08 RX ORDER — LEVOTHYROXINE SODIUM 0.05 MG/1
50 TABLET ORAL DAILY
Qty: 90 TABLET | Refills: 1 | Status: SHIPPED | OUTPATIENT
Start: 2024-07-08

## 2024-07-08 RX ORDER — PANTOPRAZOLE SODIUM 40 MG/1
40 TABLET, DELAYED RELEASE ORAL DAILY
Qty: 90 TABLET | Refills: 1 | Status: SHIPPED | OUTPATIENT
Start: 2024-07-08

## 2024-07-08 RX ORDER — LOSARTAN POTASSIUM 100 MG/1
100 TABLET ORAL DAILY
Qty: 90 TABLET | Refills: 1 | Status: SHIPPED | OUTPATIENT
Start: 2024-07-08

## 2024-07-08 RX ORDER — ERGOCALCIFEROL 1.25 MG/1
50000 CAPSULE ORAL WEEKLY
Qty: 12 CAPSULE | Refills: 0 | Status: SHIPPED | OUTPATIENT
Start: 2024-07-08

## 2024-07-08 NOTE — TELEPHONE ENCOUNTER
Caller: Sneha Cook    Relationship: Self    Best call back number: [phone]    Requested Prescriptions:   Requested Prescriptions     Pending Prescriptions Disp Refills    cyclobenzaprine (FLEXERIL) 10 MG tablet 90 tablet 1     Sig: Take 1 tablet by mouth At Night As Needed for Muscle Spasms.    losartan (COZAAR) 100 MG tablet 90 tablet 1     Sig: Take 1 tablet by mouth Daily.    meloxicam (Mobic) 15 MG tablet 30 tablet 0     Sig: Take 1 tablet by mouth Daily As Needed for Moderate Pain.    metFORMIN ER (GLUCOPHAGE-XR) 750 MG 24 hr tablet 90 tablet      Sig: Take 1 tablet by mouth Daily. Take one tablet a day.    pantoprazole (PROTONIX) 40 MG EC tablet 90 tablet 1     Sig: Take 1 tablet by mouth Daily.    simvastatin (ZOCOR) 40 MG tablet 90 tablet 1     Sig: Take 1 tablet by mouth Every Night.    levothyroxine (Synthroid) 50 MCG tablet 90 tablet 1     Sig: Take 1 tablet by mouth Daily.    vitamin D (ERGOCALCIFEROL) 1.25 MG (41566 UT) capsule capsule 12 capsule 0     Sig: Take 1 capsule by mouth 1 (One) Time Per Week.        Pharmacy where request should be sent: [facility] #[id number] [place] [address] AT [facility] [address]  [phone] [facility] [phone] FX     Last office visit with prescribing clinician: 6/12/2024   Last telemedicine visit with prescribing clinician: Visit date not found   Next office visit with prescribing clinician: 8/13/2024     Additional details provided by patient: WILL NEED FILLED AT [facility]    Does the patient have less than a 3 day supply:  [] Yes  [x] No    Would you like a call back once the refill request has been completed: [] Yes [x] No    If the office needs to give you a call back, can they leave a voicemail: [] Yes [x] No    Emma Ruiz   07/08/24 15:23 EDT

## 2024-07-25 ENCOUNTER — TELEPHONE (OUTPATIENT)
Dept: FAMILY MEDICINE CLINIC | Facility: CLINIC | Age: 79
End: 2024-07-25

## 2024-07-25 NOTE — TELEPHONE ENCOUNTER
Caller: Sneha Cook    Relationship: Self    Best call back number: [phone]     What is the best time to reach you: ANY    Who are you requesting to speak with (clinical staff, provider,  specific staff member): CLINICAL STAFF    Do you know the name of the person who called:      What was the call regarding: PATIENT CALLED SHE HAS GOTTEN THE MRI SPINE APPROVED THROUGH HER INSURANCE BUT SHE IS NOT ABLE TO FIND A LOCATION THAT CAN DO IT SOON. SHE CURRENTLY HAS AN APPT WITH [facility] ON 9/03/24.  BUT SHE IS IN A LOT OF PAIN AND WANTS IT SOONER.  WANTS TO KNOW WHAT SHE NEEDS TO DO AND PLEASE CALL HER BACK.  WANTS LOCATION CLOSE TO HOME IF POSSIBLE.    Is it okay if the provider responds through MyChart:

## 2024-07-25 NOTE — TELEPHONE ENCOUNTER
Caller: Sneha Cook    Relationship: Self    Best call back number: [phone]     What is the best time to reach you: ANY    Who are you requesting to speak with (clinical staff, provider,  specific staff member): CLINICAL STAFF    Do you know the name of the person who called:      What was the call regarding: PATIENT CALLED STATED SHE HAS THE APPT FOR MRI SPINE IN 9/03/24 BUT SHE SCHEDULE ANOTHER ONE WITH [facility] ON 8/02/24 AND PATIENT JUST WANTS TO LET MEDICAL TEAM KNOW THAT AFTER SHE GETS FINISHED WITH THE APPT ON 8/02/24 SHE WILL CANCEL THE SEPT APPT DUE TO WANTING TO MAKE SURE SHE CAN GET IT DONE BEFORE CANCELLING THE OTHER APPT.      Is it okay if the provider responds through [facility]:

## 2024-07-29 ENCOUNTER — PATIENT MESSAGE (OUTPATIENT)
Dept: FAMILY MEDICINE CLINIC | Facility: CLINIC | Age: 79
End: 2024-07-29
Payer: MEDICARE

## 2024-07-29 DIAGNOSIS — E03.9 HYPOTHYROIDISM, UNSPECIFIED TYPE: ICD-10-CM

## 2024-07-29 RX ORDER — LEVOTHYROXINE SODIUM 0.05 MG/1
50 TABLET ORAL DAILY
Qty: 90 TABLET | Refills: 1 | Status: SHIPPED | OUTPATIENT
Start: 2024-07-29 | End: 2024-07-31 | Stop reason: SDUPTHER

## 2024-07-29 NOTE — TELEPHONE ENCOUNTER
From: Sneha Cook  To: Felix Barone  Sent: 7/29/2024 10:59 AM EDT  Subject: New prescription    Please write a new prescription for levothyroxine fix 50 µg tabs take one tablet every day. If you need where to send it, I’m guessing [facility]. Or [facility] by your office on [facility] if you have any questions my number is [phone].

## 2024-07-31 ENCOUNTER — OFFICE VISIT (OUTPATIENT)
Dept: FAMILY MEDICINE CLINIC | Facility: CLINIC | Age: 79
End: 2024-07-31
Payer: MEDICARE

## 2024-07-31 ENCOUNTER — TELEPHONE (OUTPATIENT)
Dept: FAMILY MEDICINE CLINIC | Facility: CLINIC | Age: 79
End: 2024-07-31

## 2024-07-31 VITALS
HEIGHT: 65 IN | DIASTOLIC BLOOD PRESSURE: 70 MMHG | SYSTOLIC BLOOD PRESSURE: 142 MMHG | OXYGEN SATURATION: 97 % | RESPIRATION RATE: 16 BRPM | WEIGHT: 173 LBS | BODY MASS INDEX: 28.82 KG/M2 | TEMPERATURE: 97.7 F | HEART RATE: 70 BPM

## 2024-07-31 DIAGNOSIS — M54.16 CHRONIC RADICULAR PAIN OF LOWER BACK: Primary | ICD-10-CM

## 2024-07-31 DIAGNOSIS — G89.29 CHRONIC RADICULAR PAIN OF LOWER BACK: ICD-10-CM

## 2024-07-31 DIAGNOSIS — E03.9 HYPOTHYROIDISM, UNSPECIFIED TYPE: ICD-10-CM

## 2024-07-31 DIAGNOSIS — I10 PRIMARY HYPERTENSION: ICD-10-CM

## 2024-07-31 DIAGNOSIS — G89.29 CHRONIC RADICULAR PAIN OF LOWER BACK: Primary | ICD-10-CM

## 2024-07-31 DIAGNOSIS — M48.062 SPINAL STENOSIS OF LUMBAR REGION WITH NEUROGENIC CLAUDICATION: ICD-10-CM

## 2024-07-31 DIAGNOSIS — M51.36 DDD (DEGENERATIVE DISC DISEASE), LUMBAR: ICD-10-CM

## 2024-07-31 DIAGNOSIS — M54.16 CHRONIC RADICULAR PAIN OF LOWER BACK: ICD-10-CM

## 2024-07-31 PROCEDURE — 3078F DIAST BP <80 MM HG: CPT | Performed by: FAMILY MEDICINE

## 2024-07-31 PROCEDURE — 3077F SYST BP >= 140 MM HG: CPT | Performed by: FAMILY MEDICINE

## 2024-07-31 PROCEDURE — 1126F AMNT PAIN NOTED NONE PRSNT: CPT | Performed by: FAMILY MEDICINE

## 2024-07-31 PROCEDURE — 99214 OFFICE O/P EST MOD 30 MIN: CPT | Performed by: FAMILY MEDICINE

## 2024-07-31 PROCEDURE — 1160F RVW MEDS BY RX/DR IN RCRD: CPT | Performed by: FAMILY MEDICINE

## 2024-07-31 PROCEDURE — 1159F MED LIST DOCD IN RCRD: CPT | Performed by: FAMILY MEDICINE

## 2024-07-31 RX ORDER — MELOXICAM 15 MG/1
15 TABLET ORAL DAILY PRN
Qty: 30 TABLET | Refills: 0 | Status: SHIPPED | OUTPATIENT
Start: 2024-07-31

## 2024-07-31 RX ORDER — LEVOTHYROXINE SODIUM 0.05 MG/1
50 TABLET ORAL DAILY
Qty: 90 TABLET | Refills: 1 | Status: SHIPPED | OUTPATIENT
Start: 2024-07-31

## 2024-07-31 NOTE — PROGRESS NOTES
Subjective     Sneha Cook is a 79 y.o. female.     Chief Complaint   Patient presents with    Imaging Only     Discuss MRI, discuss medication.    Hypertension    Hypothyroidism    Med Refill       History of Present Illness     Pt with Chronic radicular lower back pain with Spinal stenosis for many years   Was following with NS and pain clinic, had PT in the past. Had one time Epidural in past , helped somewhat.   Her sx worse lately that affects her daily activity , pain 5-8/10, worse at night or when she turns around. She has constant tingling and numbnbess in toes .   Was on Micaela, could not tolerated    On Meloxicam PRN .  She had MRI done in 2022, which showed DDD at many levels.    Last MRI done few months ago/copy in media showed worsening DDD and spinal stenosis   Denies LE weakness.   Using cane for walking     HTN; elevated as she is in pain. Rechecked better.    Hypothyroidism s/p thyroidectomy;   Doing well on Levothyr 50 mcg.     Lab Results   Component Value Date    TSH 1.070 06/12/2024       Lab Results   Component Value Date    HGBA1C 6.9 (A) 06/12/2024     Lab Results   Component Value Date    GLUCOSE 134 (H) 06/12/2024    BUN 19 06/12/2024    CREATININE 0.92 06/12/2024    EGFRRESULT 63.1 06/12/2024    BCR 20.7 06/12/2024    K 4.8 06/12/2024    CO2 28.5 06/12/2024    CALCIUM 9.4 06/12/2024    PROTENTOTREF 6.5 06/12/2024    ALBUMIN 4.4 06/12/2024    BILITOT 0.4 06/12/2024    AST 18 06/12/2024    ALT 11 06/12/2024              The following portions of the patient's history were reviewed and updated as appropriate: allergies, current medications, past family history, past medical history, past social history, past surgical history, and problem list.        Review of Systems   Musculoskeletal:  Positive for back pain and gait problem.       Vitals:    07/31/24 1431   BP: 142/70   Pulse: 70   Resp: 16   Temp: 97.7 °F (36.5 °C)   SpO2: 97%           07/31/24  1431   Weight: 78.5 kg (173 lb)          Body mass index is 28.79 kg/m².      Current Outpatient Medications   Medication Sig Dispense Refill    levothyroxine (Synthroid) 50 MCG tablet Take 1 tablet by mouth Daily. 90 tablet 1    losartan (COZAAR) 100 MG tablet Take 1 tablet by mouth Daily. 90 tablet 1    Magnesium 400 MG capsule Take  by mouth.      meloxicam (Mobic) 15 MG tablet Take 1 tablet by mouth Daily As Needed for Moderate Pain. 30 tablet 0    metFORMIN ER (GLUCOPHAGE-XR) 750 MG 24 hr tablet Take 1 tablet by mouth Daily. Take one tablet a day. 90 tablet 0    Omega-3 Fatty Acids (fish oil) 1000 MG capsule capsule Take  by mouth Daily With Breakfast.      pantoprazole (PROTONIX) 40 MG EC tablet Take 1 tablet by mouth Daily. 90 tablet 1    simvastatin (ZOCOR) 40 MG tablet Take 1 tablet by mouth Every Night. 90 tablet 1     No current facility-administered medications for this visit.                Objective   Physical Exam  Vitals and nursing note reviewed.   Cardiovascular:      Rate and Rhythm: Normal rate and regular rhythm.      Heart sounds: Normal heart sounds. No murmur heard.  Neurological:      Mental Status: She is alert and oriented to person, place, and time.   Psychiatric:         Mood and Affect: Mood normal.         Behavior: Behavior normal.         Thought Content: Thought content normal.           Assessment & Plan   Diagnoses and all orders for this visit:    1. Chronic radicular pain of lower back (Primary)  Comments:  discussed in length about her new MRI   advised to see NS  Orders:  -     Ambulatory Referral to Spine Surgery  -     meloxicam (Mobic) 15 MG tablet; Take 1 tablet by mouth Daily As Needed for Moderate Pain.  Dispense: 30 tablet; Refill: 0    2. DDD (degenerative disc disease), lumbar  Comments:  as above  Orders:  -     Ambulatory Referral to Spine Surgery    3. Spinal stenosis of lumbar region with neurogenic claudication  Comments:  2/2 worsening sx, needs to see NS  Orders:  -     meloxicam (Mobic) 15 MG  tablet; Take 1 tablet by mouth Daily As Needed for Moderate Pain.  Dispense: 30 tablet; Refill: 0    4. Chronic radicular pain of lower back  Comments:  as above  Orders:  -     Ambulatory Referral to Spine Surgery  -     meloxicam (Mobic) 15 MG tablet; Take 1 tablet by mouth Daily As Needed for Moderate Pain.  Dispense: 30 tablet; Refill: 0    5. Hypothyroidism, unspecified type  Comments:  last TSH wnl  will continue same dose  Orders:  -     levothyroxine (Synthroid) 50 MCG tablet; Take 1 tablet by mouth Daily.  Dispense: 90 tablet; Refill: 1    6. Primary hypertension  Comments:  elevated liklely 2/2 pain  rechecked better          I spent 36 minutes caring for this patient on this date of service. This time includes time spent by me in the following activities:preparing for the visit,reviewing previous medical records,  performing a medically appropriate examination and/or evaluation, counseling and educating the patient/family/caregiver, and documenting information in the medical record.           Patient was given instructions and counseling regarding her condition or for health maintenance advice. Please see specific information pulled into the AVS if appropriate.     I have fully discussed the nature of the medical condition(s) risks, complications, management, safe and proper use of medications.   Pt stated no allergy to the above prescribed medication.  I have discussed the SIDE EFFECT OF MEDICATION and importance TO report any side effect , the patient expressed good understanding.  Encouraged medication compliance and the importance of keeping scheduled follow up appointments with me and any other providers.    Patient instructed to follow up with our office for results on any labs/imaging ordered during this visit.    Home care discussed  All questions answered  Patient verbalizes understanding and agrees to treatment plan.     Follow up: Return in about 8 weeks (around 9/23/2024) for medicare  wellness, labs before apointment.

## 2024-08-01 ENCOUNTER — TELEPHONE (OUTPATIENT)
Dept: FAMILY MEDICINE CLINIC | Facility: CLINIC | Age: 79
End: 2024-08-01
Payer: MEDICARE

## 2024-08-01 NOTE — TELEPHONE ENCOUNTER
Laurita.. she can walk as tolerated till see spine surgeon     [facility] .. See her request about spine surgeon at [facility]

## 2024-08-01 NOTE — TELEPHONE ENCOUNTER
Pt called for two reasons    She walks 1-2 miles a day and wants to know if this is still okay after her bad MRI Results      2. She would like to see Dr. Bae with [facility]. Fax: [phone]    She has already spoken to them and they can see her soon      She is requesting to be contacted ASAP after the referral has been faxed and after. Dr. Barone responds to her question about the walking

## 2024-08-06 ENCOUNTER — TELEPHONE (OUTPATIENT)
Dept: FAMILY MEDICINE CLINIC | Facility: CLINIC | Age: 79
End: 2024-08-06
Payer: MEDICARE

## 2024-08-06 NOTE — TELEPHONE ENCOUNTER
Caller: Sneha Cook    Relationship: Self    Best call back number: [phone]    What is the medical concern/diagnosis: SPINAL ISSUES    What is the provider, practice or medical service name: PATIENT WANTS TO BE REFERRED TO A NON-INVASIVE PHYSICIAN. THE PATIENT SUGGESTS POSSIBLY THE MILD PROGRAM - MILDLY INVASIVE LUMBAR DECOMPRESSION, OR ELLIQUENCE PROCEDURE, OF [facility]. PATENT STATES THAT SHE WOULD PREFER THE MILD PROGRAM.    What is the office location:     What is the office phone number:     Any additional details: PATIENT CALLING STATING THAT DR. SHAW, SPINAL SURGEON, THAT SHE HAS BEEN REFERRED TO, HAS RETIRED.

## 2024-08-06 NOTE — TELEPHONE ENCOUNTER
Zita April...  I referred her to Dr King.. she said he is retired   So plz refer her to another dr that she recommends before , I think at [facility]

## 2024-08-07 DIAGNOSIS — G89.29 CHRONIC RADICULAR PAIN OF LOWER BACK: ICD-10-CM

## 2024-08-07 DIAGNOSIS — M54.16 CHRONIC RADICULAR PAIN OF LOWER BACK: ICD-10-CM

## 2024-08-07 DIAGNOSIS — M51.36 DDD (DEGENERATIVE DISC DISEASE), LUMBAR: Primary | ICD-10-CM

## 2024-08-07 DIAGNOSIS — R20.0 NUMBNESS AND TINGLING OF FOOT: ICD-10-CM

## 2024-08-07 DIAGNOSIS — R20.2 NUMBNESS AND TINGLING OF FOOT: ICD-10-CM

## 2024-08-07 DIAGNOSIS — M48.062 SPINAL STENOSIS OF LUMBAR REGION WITH NEUROGENIC CLAUDICATION: ICD-10-CM

## 2024-08-07 NOTE — TELEPHONE ENCOUNTER
Spoke with the office of Dr Bae, per patient's request, of [facility]. They advised faxing referral documentation to their offices for review. Once referral has been reviewed, facility will determine which provider fits diagnosis. Facility was unable to provide approximate appointment date, until the notes have been reviewed.     Patient also requested seeing Dr Juliano Lowe, that provider is with [facility].  Will speak with provider about placing the referral, if she deems appropriate.    Patient has also requested we speak with the office of Dr Perez, [facility], will be contacted in the morning.

## 2024-08-09 NOTE — TELEPHONE ENCOUNTER
Caller: Sneha Cook    Relationship: Self    Best call back number: [phone]     What was the call regarding: PATIENT IS CALLING BACK TO CHECK ON THE STATUS OF THE REFERRAL. PATIENT STATED SHE LAST TALKED TO [facility] ON 08/07 WITH TWO RECOMMENDED PROVIDERS - DR. MARTINEZ AND DR. WOOD. PATIENT STATED SHE HAD NOT HEARD BACK FROM [facility]. PLEASE ADVISE.

## 2024-08-09 NOTE — TELEPHONE ENCOUNTER
Called patient, patient aware pain management referral was faxed to [facility], Dr Lowe and Dr. Alphonso Bae at [facility]. [facility] procedure is to review all patients records prior too selecting the correct physician to schedule with. Once all this is decided [facility] will contact patient to schedule.

## 2024-08-09 NOTE — TELEPHONE ENCOUNTER
My understanding of current referral status is that pt should reach out directly to:    Alphonso Bae MD  Phone Number:[phone]     Juliano Lowe MD  Phone Number:[phone]     What about ? No referral - Does that mean  did not agree w this additional referral?

## 2024-08-19 ENCOUNTER — TELEPHONE (OUTPATIENT)
Dept: FAMILY MEDICINE CLINIC | Facility: CLINIC | Age: 79
End: 2024-08-19

## 2024-08-19 DIAGNOSIS — M48.062 SPINAL STENOSIS OF LUMBAR REGION WITH NEUROGENIC CLAUDICATION: ICD-10-CM

## 2024-08-19 DIAGNOSIS — M54.16 CHRONIC RADICULAR PAIN OF LOWER BACK: Primary | ICD-10-CM

## 2024-08-19 DIAGNOSIS — M51.36 DDD (DEGENERATIVE DISC DISEASE), LUMBAR: ICD-10-CM

## 2024-08-19 DIAGNOSIS — G89.29 CHRONIC RADICULAR PAIN OF LOWER BACK: Primary | ICD-10-CM

## 2024-08-19 RX ORDER — TRAMADOL HYDROCHLORIDE 50 MG/1
50 TABLET ORAL EVERY 12 HOURS PRN
Qty: 10 TABLET | Refills: 0 | Status: SHIPPED | OUTPATIENT
Start: 2024-08-19

## 2024-09-03 ENCOUNTER — TELEPHONE (OUTPATIENT)
Dept: FAMILY MEDICINE CLINIC | Facility: CLINIC | Age: 79
End: 2024-09-03
Payer: MEDICARE

## 2024-09-03 NOTE — TELEPHONE ENCOUNTER
Caller: Sneha Cook    Relationship: Self    Best call back number: [phone]     What was the call regarding: PATIENT STATES THAT SHE IS SCHEDULED FOR AN MRI TOMORROW, BUT WANTED TO MAKE SURE THAT ORDERS HAD BEEN SENT. PLEASE CALL TO CONFIRM

## 2024-09-09 DIAGNOSIS — E78.5 DYSLIPIDEMIA: ICD-10-CM

## 2024-09-09 DIAGNOSIS — E03.9 ACQUIRED HYPOTHYROIDISM: ICD-10-CM

## 2024-09-09 DIAGNOSIS — E03.9 HYPOTHYROIDISM, UNSPECIFIED TYPE: Primary | ICD-10-CM

## 2024-09-09 DIAGNOSIS — I10 PRIMARY HYPERTENSION: ICD-10-CM

## 2024-09-09 DIAGNOSIS — Z13.0 SCREENING, ANEMIA, DEFICIENCY, IRON: ICD-10-CM

## 2024-09-09 DIAGNOSIS — E11.9 DM TYPE 2, GOAL HBA1C < 7%: ICD-10-CM

## 2024-09-17 ENCOUNTER — TELEMEDICINE (OUTPATIENT)
Dept: FAMILY MEDICINE CLINIC | Facility: CLINIC | Age: 79
End: 2024-09-17
Payer: MEDICARE

## 2024-09-17 DIAGNOSIS — M51.36 DDD (DEGENERATIVE DISC DISEASE), LUMBAR: ICD-10-CM

## 2024-09-17 DIAGNOSIS — M48.062 SPINAL STENOSIS OF LUMBAR REGION WITH NEUROGENIC CLAUDICATION: ICD-10-CM

## 2024-09-17 DIAGNOSIS — M54.16 CHRONIC RADICULAR PAIN OF LOWER BACK: Primary | ICD-10-CM

## 2024-09-17 DIAGNOSIS — E03.9 ACQUIRED HYPOTHYROIDISM: ICD-10-CM

## 2024-09-17 DIAGNOSIS — I10 PRIMARY HYPERTENSION: ICD-10-CM

## 2024-09-17 DIAGNOSIS — E78.5 DYSLIPIDEMIA: ICD-10-CM

## 2024-09-17 DIAGNOSIS — G89.29 CHRONIC RADICULAR PAIN OF LOWER BACK: Primary | ICD-10-CM

## 2024-09-17 PROCEDURE — 1159F MED LIST DOCD IN RCRD: CPT | Performed by: FAMILY MEDICINE

## 2024-09-17 PROCEDURE — 1160F RVW MEDS BY RX/DR IN RCRD: CPT | Performed by: FAMILY MEDICINE

## 2024-09-17 PROCEDURE — 99214 OFFICE O/P EST MOD 30 MIN: CPT | Performed by: FAMILY MEDICINE

## 2024-09-17 PROCEDURE — 1126F AMNT PAIN NOTED NONE PRSNT: CPT | Performed by: FAMILY MEDICINE

## 2024-09-17 RX ORDER — CYCLOBENZAPRINE HCL 10 MG
10 TABLET ORAL 2 TIMES DAILY PRN
Qty: 60 TABLET | Refills: 3 | Status: SHIPPED | OUTPATIENT
Start: 2024-09-17

## 2024-09-17 RX ORDER — IBUPROFEN 200 MG
200 TABLET ORAL EVERY 6 HOURS PRN
COMMUNITY

## 2024-09-17 RX ORDER — TRAMADOL HYDROCHLORIDE 50 MG/1
50 TABLET ORAL EVERY 12 HOURS PRN
Qty: 60 TABLET | Refills: 0 | Status: SHIPPED | OUTPATIENT
Start: 2024-09-17 | End: 2024-10-17

## 2024-09-17 RX ORDER — LOSARTAN POTASSIUM 100 MG/1
100 TABLET ORAL DAILY
Qty: 90 TABLET | Refills: 1 | Status: SHIPPED | OUTPATIENT
Start: 2024-09-17

## 2024-09-17 RX ORDER — CYCLOBENZAPRINE HCL 10 MG
10 TABLET ORAL
COMMUNITY
End: 2024-09-17 | Stop reason: SDUPTHER

## 2024-10-03 ENCOUNTER — FLU SHOT (OUTPATIENT)
Dept: FAMILY MEDICINE CLINIC | Facility: CLINIC | Age: 79
End: 2024-10-03
Payer: MEDICARE

## 2024-10-03 DIAGNOSIS — Z23 NEED FOR VACCINATION: Primary | ICD-10-CM

## 2024-10-03 PROCEDURE — G0008 ADMIN INFLUENZA VIRUS VAC: HCPCS | Performed by: FAMILY MEDICINE

## 2024-10-03 PROCEDURE — 90662 IIV NO PRSV INCREASED AG IM: CPT | Performed by: FAMILY MEDICINE

## 2025-01-28 ENCOUNTER — TELEPHONE (OUTPATIENT)
Dept: FAMILY MEDICINE CLINIC | Facility: CLINIC | Age: 80
End: 2025-01-28

## 2025-01-28 NOTE — TELEPHONE ENCOUNTER
Hub staff attempted to follow warm transfer process and was unsuccessful     Caller: Sneha Cook    Relationship to patient: Self    Best call back number: [phone]     Patient is needing: PATIENT IS WANTING TO SEE IF THERE IS AN EARLIER APPOINTMENT FOR 01/29 BECAUSE SHE WILL NEED TO DO FASTING LABS. PLEASE CALL TO ADVISE

## 2025-01-28 NOTE — TELEPHONE ENCOUNTER
Caller: Sneha Cook    Relationship: Self    Best call back number: [phone]     What medication are you requesting: TERBINAFINE    What are your current symptoms: FUNGUS ON RIGHT THUMB UNDERNEATH NAIL     How long have you been experiencing symptoms: LESS THAN A WEEK     Have you had these symptoms before:    [x] Yes  [] No    Have you been treated for these symptoms before:   [x] Yes  [] No    If a prescription is needed, what is your preferred pharmacy and phone number:  [id number] [address] AT [facility] [address] - [phone] [facility] [phone] FX

## 2025-01-28 NOTE — TELEPHONE ENCOUNTER
We need to check blood work and check liver enzy before starting on such medication  She is due for wellness and labs

## 2025-01-29 ENCOUNTER — OFFICE VISIT (OUTPATIENT)
Dept: FAMILY MEDICINE CLINIC | Facility: CLINIC | Age: 80
End: 2025-01-29
Payer: MEDICARE

## 2025-01-29 VITALS
WEIGHT: 172 LBS | HEIGHT: 65 IN | OXYGEN SATURATION: 96 % | HEART RATE: 88 BPM | BODY MASS INDEX: 28.66 KG/M2 | SYSTOLIC BLOOD PRESSURE: 130 MMHG | TEMPERATURE: 97.3 F | DIASTOLIC BLOOD PRESSURE: 80 MMHG

## 2025-01-29 DIAGNOSIS — I10 PRIMARY HYPERTENSION: ICD-10-CM

## 2025-01-29 DIAGNOSIS — B35.1 ONYCHOMYCOSIS: ICD-10-CM

## 2025-01-29 DIAGNOSIS — K21.9 CHRONIC GERD: ICD-10-CM

## 2025-01-29 DIAGNOSIS — E11.42 DM TYPE 2 WITH DIABETIC PERIPHERAL NEUROPATHY: ICD-10-CM

## 2025-01-29 DIAGNOSIS — Z00.00 MEDICARE ANNUAL WELLNESS VISIT, SUBSEQUENT: Primary | ICD-10-CM

## 2025-01-29 DIAGNOSIS — M48.062 SPINAL STENOSIS OF LUMBAR REGION WITH NEUROGENIC CLAUDICATION: ICD-10-CM

## 2025-01-29 DIAGNOSIS — E78.5 DYSLIPIDEMIA: ICD-10-CM

## 2025-01-29 DIAGNOSIS — Z13.0 SCREENING, ANEMIA, DEFICIENCY, IRON: ICD-10-CM

## 2025-01-29 DIAGNOSIS — E11.9 DM TYPE 2, GOAL HBA1C < 7%: ICD-10-CM

## 2025-01-29 DIAGNOSIS — E03.9 ACQUIRED HYPOTHYROIDISM: ICD-10-CM

## 2025-01-29 DIAGNOSIS — M54.16 CHRONIC RADICULAR PAIN OF LOWER BACK: ICD-10-CM

## 2025-01-29 DIAGNOSIS — G89.29 CHRONIC RADICULAR PAIN OF LOWER BACK: ICD-10-CM

## 2025-01-29 LAB
EXPIRATION DATE: ABNORMAL
HBA1C MFR BLD: 7.3 % (ref 4.5–5.7)
Lab: ABNORMAL

## 2025-01-29 PROCEDURE — 83036 HEMOGLOBIN GLYCOSYLATED A1C: CPT | Performed by: FAMILY MEDICINE

## 2025-01-29 PROCEDURE — 1160F RVW MEDS BY RX/DR IN RCRD: CPT | Performed by: FAMILY MEDICINE

## 2025-01-29 PROCEDURE — 1170F FXNL STATUS ASSESSED: CPT | Performed by: FAMILY MEDICINE

## 2025-01-29 PROCEDURE — 99214 OFFICE O/P EST MOD 30 MIN: CPT | Performed by: FAMILY MEDICINE

## 2025-01-29 PROCEDURE — 3075F SYST BP GE 130 - 139MM HG: CPT | Performed by: FAMILY MEDICINE

## 2025-01-29 PROCEDURE — 3079F DIAST BP 80-89 MM HG: CPT | Performed by: FAMILY MEDICINE

## 2025-01-29 PROCEDURE — 1159F MED LIST DOCD IN RCRD: CPT | Performed by: FAMILY MEDICINE

## 2025-01-29 PROCEDURE — G0439 PPPS, SUBSEQ VISIT: HCPCS | Performed by: FAMILY MEDICINE

## 2025-01-29 PROCEDURE — 3051F HG A1C>EQUAL 7.0%<8.0%: CPT | Performed by: FAMILY MEDICINE

## 2025-01-29 PROCEDURE — 1126F AMNT PAIN NOTED NONE PRSNT: CPT | Performed by: FAMILY MEDICINE

## 2025-01-29 NOTE — PATIENT INSTRUCTIONS
Medicare Wellness  Personal Prevention Plan of Service     Date of Office Visit:    Encounter Provider:  Felix Barone MD  Place of Service:  [facility]  Patient Name: Sneha Cook  :  1945    As part of the Medicare Wellness portion of your visit today, we are providing you with this personalized preventive plan of services (PPPS). This plan is based upon recommendations of the [place] Preventive Services Task Force (USPSTF) and the Advisory Committee on Immunization Practices (ACIP).    This lists the preventive care services that should be considered, and provides dates of when you are due. Items listed as completed are up-to-date and do not require any further intervention.    Health Maintenance   Topic Date Due    TDAP/TD VACCINES (1 - Tdap) Never done    DIABETIC FOOT EXAM  2024    ANNUAL WELLNESS VISIT  2024    BMI FOLLOWUP  2024    COVID-19 Vaccine (4 - -25 season) 2024    DIABETIC EYE EXAM  2025    ZOSTER VACCINE (1 of 2) 2025 (Originally 6/10/1994)    RSV Vaccine - Adults (1 - 1-dose 75+ series) 02/15/2025 (Originally 6/10/2019)    HEMOGLOBIN A1C  2025    DXA SCAN  2025    LIPID PANEL  10/03/2025    INFLUENZA VACCINE  Completed    Pneumococcal Vaccine 65+  Completed    URINE MICROALBUMIN  Discontinued       Orders Placed This Encounter   Procedures    CBC (No Diff)     Order Specific Question:   Release to patient     Answer:   Routine Release [[id number]]    Lipid Panel     Order Specific Question:   Release to patient     Answer:   Routine Release [[id number]]    Comprehensive Metabolic Panel     Order Specific Question:   Release to patient     Answer:   Routine Release [[id number]]    TSH     Order Specific Question:   Release to patient     Answer:   Routine Release [[id number]]    Microalbumin / Creatinine Urine Ratio - Urine, Clean Catch     Order Specific Question:   Release to patient      Answer:   Routine Release [[id number]]    POC Glycosylated Hemoglobin (Hb A1C)     Order Specific Question:   Release to patient     Answer:   Routine Release [[id number]]       No follow-ups on file.

## 2025-01-29 NOTE — PROGRESS NOTES
Subjective   The ABCs of the Annual Wellness Visit  Medicare Wellness Visit      Sneha Cook is a 79 y.o. patient who presents for a Medicare Wellness Visit.    The following portions of the patient's history were reviewed and   updated as appropriate: allergies, current medications, past family history, past medical history, past social history, past surgical history, and problem list.    Compared to one year ago, the patient's physical   health is the same.  Compared to one year ago, the patient's mental   health is the same.    Recent Hospitalizations:  She was not admitted to the hospital during the last year.     Current Medical Providers:  Patient Care Team:  Felix Barone MD as PCP - General (Urgent Care)    Outpatient Medications Prior to Visit   Medication Sig Dispense Refill    cyclobenzaprine (FLEXERIL) 10 MG tablet Take 1 tablet by mouth 2 (Two) Times a Day As Needed for Muscle Spasms. 60 tablet 3    diphenhydrAMINE-acetaminophen (TYLENOL PM)  MG tablet per tablet Take 1 tablet by mouth At Night As Needed.      ibuprofen (ADVIL,MOTRIN) 200 MG tablet Take 1 tablet by mouth Every 6 (Six) Hours As Needed.      levothyroxine (Synthroid) 50 MCG tablet Take 1 tablet by mouth Daily. 90 tablet 1    losartan (COZAAR) 100 MG tablet Take 1 tablet by mouth Daily. 90 tablet 1    Magnesium 400 MG capsule Take  by mouth.      metFORMIN ER (GLUCOPHAGE-XR) 750 MG 24 hr tablet Take 1 tablet by mouth Daily. Take one tablet a day. 90 tablet 0    pantoprazole (PROTONIX) 40 MG EC tablet Take 1 tablet by mouth Daily. 90 tablet 1    simvastatin (ZOCOR) 40 MG tablet Take 1 tablet by mouth Every Night. 90 tablet 1    meloxicam (Mobic) 15 MG tablet Take 1 tablet by mouth Daily As Needed for Moderate Pain. (Patient not taking: Reported on 1/29/2025) 30 tablet 0    Omega-3 Fatty Acids (fish oil) 1000 MG capsule capsule Take  by mouth Daily With Breakfast. (Patient not taking: Reported on 1/29/2025)       No  "facility-administered medications prior to visit.     No opioid medication identified on active medication list. I have reviewed chart for other potential  high risk medication/s and harmful drug interactions in the elderly.      Aspirin is not on active medication list.  Aspirin use is not indicated based on review of current medical condition/s. Risk of harm outweighs potential benefits.  .    Patient Active Problem List   Diagnosis    Flexor carpi radialis tenosynovitis    Spinal stenosis of lumbar region with neurogenic claudication    Hypertension    Hypothyroidism    Insomnia    Hyperlipidemia     Advance Care Planning Advance Directive is not on file.  ACP discussion was held with the patient during this visit. Patient has an advance directive (not in EMR), copy requested.            Objective   Vitals:    25 1151   BP: 130/80   BP Location: Right arm   Patient Position: Sitting   Cuff Size: Adult   Pulse: 88   Temp: 97.3 °F (36.3 °C)   SpO2: 96%   Weight: 78 kg (172 lb)   Height: 165.1 cm (65\")   PainSc: 0-No pain       Estimated body mass index is 28.62 kg/m² as calculated from the following:    Height as of this encounter: 165.1 cm (65\").    Weight as of this encounter: 78 kg (172 lb).    BMI is >= 25 and <30. (Overweight) The following options were offered after discussion;: exercise counseling/recommendations and nutrition counseling/recommendations           Does the patient have evidence of cognitive impairment? No  Lab Results   Component Value Date    HGBA1C 7.3 (A) 2025                                                                                                Health  Risk Assessment    Smoking Status:  Social History     Tobacco Use   Smoking Status Former    Current packs/day: 0.00    Average packs/day: 1 pack/day for 15.0 years (15.0 ttl pk-yrs)    Types: Cigarettes    Start date: 1955    Quit date: 1970    Years since quittin.1   Smokeless Tobacco Never     Alcohol " Consumption:  Social History     Substance and Sexual Activity   Alcohol Use Not Currently    Comment: Stopped all alcohol in         Fall Risk Screen  FRANCIA Fall Risk Assessment was completed, and patient is at LOW risk for falls.Assessment completed on:2025    Depression Screening   Little interest or pleasure in doing things? Not at all   Feeling down, depressed, or hopeless? Not at all   PHQ-2 Total Score 0      Health Habits and Functional and Cognitive Screenin/29/2025    11:56 AM   Functional & Cognitive Status   Do you have difficulty preparing food and eating? No   Do you have difficulty bathing yourself, getting dressed or grooming yourself? No   Do you have difficulty using the toilet? No   Do you have difficulty moving around from place to place? No   Do you have trouble with steps or getting out of a bed or a chair? No   Current Diet Well Balanced Diet   Dental Exam Not up to date   Eye Exam Not up to date   Exercise (times per week) 7 times per week   Current Exercises Include Walking   Do you need help using the phone?  No   Are you deaf or do you have serious difficulty hearing?  Yes   Do you need help to go to places out of walking distance? No   Do you need help shopping? No   Do you need help preparing meals?  No   Do you need help with housework?  No   Do you need help with laundry? No   Do you need help taking your medications? No   Do you need help managing money? No   Do you ever drive or ride in a car without wearing a seat belt? No   Have you felt unusual stress, anger or loneliness in the last month? No   Who do you live with? Child   If you need help, do you have trouble finding someone available to you? No   Have you been bothered in the last four weeks by sexual problems? No   Do you have difficulty concentrating, remembering or making decisions? No           Age-appropriate Screening Schedule:  Refer to the list below for future screening recommendations based on  patient's age, sex and/or medical conditions. Orders for these recommended tests are listed in the plan section. The patient has been provided with a written plan.    Health Maintenance List  Health Maintenance   Topic Date Due    TDAP/TD VACCINES (1 - Tdap) Never done    COVID-19 Vaccine (4 - 2024-25 season) 09/01/2024    DIABETIC EYE EXAM  01/02/2025    ZOSTER VACCINE (1 of 2) 02/01/2025 (Originally 6/10/1994)    RSV Vaccine - Adults (1 - 1-dose 75+ series) 02/15/2025 (Originally 6/10/2019)    HEMOGLOBIN A1C  07/29/2025    DXA SCAN  08/29/2025    LIPID PANEL  10/03/2025    ANNUAL WELLNESS VISIT  01/29/2026    DIABETIC FOOT EXAM  01/29/2026    BMI FOLLOWUP  01/29/2026    INFLUENZA VACCINE  Completed    Pneumococcal Vaccine 65+  Completed    URINE MICROALBUMIN  Discontinued                                                                                                                                                CMS Preventative Services Quick Reference  Risk Factors Identified During Encounter  Immunizations Discussed/Encouraged: Influenza and Shingrix  Dental Screening Recommended  Vision Screening Recommended    The above risks/problems have been discussed with the patient.  Pertinent information has been shared with the patient in the After Visit Summary.  An After Visit Summary and PPPS were made available to the patient.    Follow Up:   Next Medicare Wellness visit to be scheduled in 1 year.         Additional E&M Note during same encounter follows:  Patient has additional, significant, and separately identifiable condition(s)/problem(s) that require work above and beyond the Medicare Wellness Visit     Chief Complaint  Medicare Wellness-subsequent (Yearly wellness with fasting labs), Diabetes, Spinal stenosis lumbar, Hypothyroidism, and Hyperlipidemia    Subjective   HPI  Sneha is also being seen today for additional medical problem/s.    Chronic radicular back pain with Spinal stenosis.   No weakness.  "  Pain improved with Epidural .  She stopped driving   Walks using the cane   Declined surgery   Follow with [facility]  Walks every day    DM type 2 with PN;   Eats HD   Doing well on MTF BID  Walks daily 2-4 miles /day    last A1c was 7.1 ,7.7 , 6.9, today 7.3     PN; c/o numbness of both feet ; she has numbness all the time, feels feet cold.   Following with pod.     HLD; eats HD , doing well on statin , no S/E reported     GERD; well controlled with current Rx/PRN, no S/E reported.        Hypothyroidism s/p thyroidectomy;  LAST tsh WNL.   Doing well on Levothyr 50 mcg. Due for labs .        Rt thumb discoloration for many months .      HTN;  well controlled with current Rx, no S/E reported. On HD    Lab Results   Component Value Date    HGBA1C 7.3 (A) 01/29/2025     Lab Results   Component Value Date    TSH 1.420 10/03/2024     Lab Results   Component Value Date    CHLPL 259 (H) 10/03/2024    TRIG 116 10/03/2024    HDL 93 (H) 10/03/2024     (H) 10/03/2024     Lab Results   Component Value Date    GLUCOSE 132 (H) 10/03/2024    BUN 25 (H) 10/03/2024    CREATININE 1.05 (H) 10/03/2024     (L) 10/03/2024    K 4.5 10/03/2024    CL 95 (L) 10/03/2024    CALCIUM 9.5 10/03/2024    PROTEINTOT 6.9 07/16/2020    ALBUMIN 4.6 10/03/2024    ALT 12 10/03/2024    AST 15 10/03/2024    ALKPHOS 100 10/03/2024    BILITOT 0.4 10/03/2024    GLOB 2.7 07/16/2020    BCR 23.8 10/03/2024          MRI Lumbar Spine Wo Con (05/26/2022 14:59)        Review of Systems   Cardiovascular:  Negative for chest pain.   Neurological:  Positive for numbness.              Objective   Vital Signs:  /80 (BP Location: Right arm, Patient Position: Sitting, Cuff Size: Adult)   Pulse 88   Temp 97.3 °F (36.3 °C)   Ht 165.1 cm (65\")   Wt 78 kg (172 lb)   SpO2 96%   BMI 28.62 kg/m²   Physical Exam  Vitals and nursing note reviewed.   Constitutional:       General: She is not in acute distress.     Appearance: She is not toxic-appearing. "   HENT:      Nose: Nose normal. No congestion or rhinorrhea.      Mouth/Throat:      Mouth: Mucous membranes are moist.   Eyes:      Conjunctiva/sclera: Conjunctivae normal.   Neck:      Comments: No enlarged thyroid      Cardiovascular:      Rate and Rhythm: Normal rate and regular rhythm.      Pulses:           Dorsalis pedis pulses are 1+ on the right side and 1+ on the left side.      Heart sounds: Normal heart sounds. No murmur heard.  Pulmonary:      Effort: Pulmonary effort is normal. No respiratory distress.      Breath sounds: Normal breath sounds. No wheezing or rhonchi.   Musculoskeletal:      Cervical back: Neck supple.      Right foot: Normal range of motion. No deformity or bunion.      Left foot: Normal range of motion. No deformity or bunion.   Feet:      Right foot:      Protective Sensation: 5 sites tested.  2 sites sensed.      Skin integrity: Skin integrity normal.      Toenail Condition: Right toenails are normal.      Left foot:      Protective Sensation: 5 sites tested.  2 sites sensed.      Skin integrity: Skin integrity normal.      Toenail Condition: Left toenails are normal.   Skin:     General: Skin is warm.      Coloration: Skin is not jaundiced.      Findings: No bruising.   Neurological:      Mental Status: She is alert and oriented to person, place, and time.   Psychiatric:         Mood and Affect: Mood normal.         Behavior: Behavior normal.         Thought Content: Thought content normal.           Diabetic Foot Exam Performed       The following data was reviewed by: Felix Barone MD on 01/29/2025:              Assessment and Plan            DM type 2, goal HbA1c < 7%      Orders:    Comprehensive Metabolic Panel    Microalbumin / Creatinine Urine Ratio - Urine, Clean Catch    DM type 2 with diabetic peripheral neuropathy      Orders:    POC Glycosylated Hemoglobin (Hb A1C)    Microalbumin / Creatinine Urine Ratio - Urine, Clean Catch    Medicare annual wellness visit,  subsequent         Acquired hypothyroidism    Orders:    TSH    Primary hypertension           Dyslipidemia    Orders:    Lipid Panel    Spinal stenosis of lumbar region with neurogenic claudication         Chronic radicular pain of lower back         Screening, anemia, deficiency, iron    Orders:    CBC (No Diff)    Chronic GERD         Onychomycosis                 We discussed preventative care including age and patient-appropriate immunizations and cancer screening. We also discussed the importance of exercise and a healthy diet. This is their annual preventative exam.      I spent 55 minutes caring for Christine on this date of service. This time includes time spent by me in the following activities:preparing for the visit, reviewing tests, obtaining and/or reviewing a separately obtained history, performing a medically appropriate examination and/or evaluation , counseling and educating the patient/family/caregiver, ordering medications, tests, or procedures, referring and communicating with other health care professionals , documenting information in the medical record, independently interpreting results and communicating that information with the patient/family/caregiver and care coordination    I spent 37 minutes caring for patient on this date of service. This time includes time spent by me in the following activities: Reviewing tests, obtaining and/or reviewing a separately obtained history, counseling and educating the patient/family/caregiver, ordering medications, tests, or procedures, referring and communicating with other health care professionals  and documenting information in the medical record              Follow Up   Return in about 4 months (around 5/29/2025) for follow up on current illness.  Patient was given instructions and counseling regarding her condition or for health maintenance advice. Please see specific information pulled into the AVS if appropriate.

## 2025-01-30 ENCOUNTER — TELEPHONE (OUTPATIENT)
Dept: FAMILY MEDICINE CLINIC | Facility: CLINIC | Age: 80
End: 2025-01-30
Payer: MEDICARE

## 2025-01-30 DIAGNOSIS — E03.9 HYPOTHYROIDISM, UNSPECIFIED TYPE: ICD-10-CM

## 2025-01-30 RX ORDER — NYSTATIN AND TRIAMCINOLONE ACETONIDE 100000; 1 [USP'U]/G; MG/G
1 OINTMENT TOPICAL 2 TIMES DAILY
Qty: 60 G | Refills: 1 | Status: SHIPPED | OUTPATIENT
Start: 2025-01-30

## 2025-01-31 LAB
ALBUMIN SERPL-MCNC: 4.3 G/DL (ref 3.8–4.8)
ALBUMIN/CREAT UR: 41 MG/G CREAT (ref 0–29)
ALP SERPL-CCNC: 114 IU/L (ref 44–121)
ALT SERPL-CCNC: 14 IU/L (ref 0–32)
AST SERPL-CCNC: 20 IU/L (ref 0–40)
BILIRUB SERPL-MCNC: 0.4 MG/DL (ref 0–1.2)
BUN SERPL-MCNC: 23 MG/DL (ref 8–27)
BUN/CREAT SERPL: 24 (ref 12–28)
CALCIUM SERPL-MCNC: 9.3 MG/DL (ref 8.7–10.3)
CHLORIDE SERPL-SCNC: 97 MMOL/L (ref 96–106)
CHOLEST SERPL-MCNC: 291 MG/DL (ref 100–199)
CO2 SERPL-SCNC: 24 MMOL/L (ref 20–29)
CREAT SERPL-MCNC: 0.97 MG/DL (ref 0.57–1)
CREAT UR-MCNC: 70.7 MG/DL
EGFRCR SERPLBLD CKD-EPI 2021: 59 ML/MIN/1.73
ERYTHROCYTE [DISTWIDTH] IN BLOOD BY AUTOMATED COUNT: 11.7 % (ref 11.7–15.4)
GLOBULIN SER CALC-MCNC: 2.4 G/DL (ref 1.5–4.5)
GLUCOSE SERPL-MCNC: 202 MG/DL (ref 70–99)
HCT VFR BLD AUTO: 39.8 % (ref 34–46.6)
HDLC SERPL-MCNC: 86 MG/DL
HGB BLD-MCNC: 12.9 G/DL (ref 11.1–15.9)
LDL CALC COMMENT:: ABNORMAL
LDLC SERPL CALC-MCNC: 175 MG/DL (ref 0–99)
MCH RBC QN AUTO: 29.5 PG (ref 26.6–33)
MCHC RBC AUTO-ENTMCNC: 32.4 G/DL (ref 31.5–35.7)
MCV RBC AUTO: 91 FL (ref 79–97)
MICROALBUMIN UR-MCNC: 29.1 UG/ML
PLATELET # BLD AUTO: 379 X10E3/UL (ref 150–450)
POTASSIUM SERPL-SCNC: 4.4 MMOL/L (ref 3.5–5.2)
PROT SERPL-MCNC: 6.7 G/DL (ref 6–8.5)
RBC # BLD AUTO: 4.38 X10E6/UL (ref 3.77–5.28)
SODIUM SERPL-SCNC: 135 MMOL/L (ref 134–144)
TRIGL SERPL-MCNC: 168 MG/DL (ref 0–149)
TSH SERPL DL<=0.005 MIU/L-ACNC: 1.02 UIU/ML (ref 0.45–4.5)
VLDLC SERPL CALC-MCNC: 30 MG/DL (ref 5–40)
WBC # BLD AUTO: 7.6 X10E3/UL (ref 3.4–10.8)

## 2025-01-31 RX ORDER — TERBINAFINE HYDROCHLORIDE 250 MG/1
250 TABLET ORAL DAILY
Qty: 90 TABLET | Refills: 1 | Status: SHIPPED | OUTPATIENT
Start: 2025-01-31

## 2025-02-03 ENCOUNTER — TELEPHONE (OUTPATIENT)
Dept: FAMILY MEDICINE CLINIC | Facility: CLINIC | Age: 80
End: 2025-02-03
Payer: MEDICARE

## 2025-02-03 DIAGNOSIS — K21.9 CHRONIC GERD: ICD-10-CM

## 2025-02-03 DIAGNOSIS — E11.9 DM TYPE 2, GOAL HBA1C < 7%: ICD-10-CM

## 2025-02-03 NOTE — TELEPHONE ENCOUNTER
Caller: Sneha Cook    Relationship: Self    Best call back number: [phone]     Which medication are you concerned about: ALL MEDICATIONS     Who prescribed you this medication: DR GUAMAN    What are your concerns: PATIENT WILL BE IN [place] FOR 2 MONTHS, LEAVING SATURDAY AND IS SWITCHING TO [facility] AND NEEDS SOMEONE TO CALL HER TO GO OVER HER MEDICATION LISTS AND WHEN IT WAS LAST FILLED AND WHEN IT IS DUE.     PATIENT WOULD ALSO LIKE TO KNOW IF HER MEDICATION COMES DUE IF SHE CAN HAVE THEM FILLED IN [place].     PATIENT IS TRYING TO HANDLE THIS BEFORE SHE IS IN [place], HOWEVER, SHE CAN ALSO DO IT ONCE SHE IS THERE IF NECESSARY IF THE PRESCRIPTIONS CAN BE DONE IN [place].     PATIENT WOULD ALSO LIKE TO KNOW IF THERE ARE ANY BENEFITS TO DOING 90 DAY REFILLS.    PLEASE CALL PATIENT TO DISCUSS AND ADVISE.

## 2025-02-04 DIAGNOSIS — E11.9 DM TYPE 2, GOAL HBA1C < 7%: ICD-10-CM

## 2025-02-04 RX ORDER — METFORMIN HYDROCHLORIDE 750 MG/1
750 TABLET, EXTENDED RELEASE ORAL DAILY
Qty: 90 TABLET | Refills: 1 | Status: SHIPPED | OUTPATIENT
Start: 2025-02-04 | End: 2025-02-10 | Stop reason: SDUPTHER

## 2025-02-04 RX ORDER — METFORMIN HYDROCHLORIDE 750 MG/1
750 TABLET, EXTENDED RELEASE ORAL DAILY
Qty: 90 TABLET | Refills: 0 | Status: CANCELLED | OUTPATIENT
Start: 2025-02-04

## 2025-02-04 RX ORDER — PANTOPRAZOLE SODIUM 40 MG/1
40 TABLET, DELAYED RELEASE ORAL DAILY
Qty: 90 TABLET | Refills: 1 | Status: SHIPPED | OUTPATIENT
Start: 2025-02-04 | End: 2025-02-10 | Stop reason: SDUPTHER

## 2025-02-04 NOTE — TELEPHONE ENCOUNTER
Caller: Sneha Cook    Relationship: Self    Best call back number: [phone]      Requested Prescriptions:   Requested Prescriptions     Pending Prescriptions Disp Refills    metFORMIN ER (GLUCOPHAGE-XR) 750 MG 24 hr tablet 90 tablet 0     Sig: Take 1 tablet by mouth Daily. Take one tablet a day.    pantoprazole (PROTONIX) 40 MG EC tablet 90 tablet 1     Sig: Take 1 tablet by mouth Daily.        Pharmacy where request should be sent: [id number] [place] [address] [address] [facility] [address]  [phone] [facility] [phone] FX     Last office visit with prescribing clinician: 1/29/2025   Last telemedicine visit with prescribing clinician: 9/17/2024   Next office visit with prescribing clinician: 5/29/2025     Additional details provided by patient: OUT OF MEDICATION.  NEEDS THIS TODAY IF POSSIBLE    Does the patient have less than a 3 day supply:  [x] Yes  [] No    Would you like a call back once the refill request has been completed: [] Yes [x] No    If the office needs to give you a call back, can they leave a voicemail: [] Yes [x] No    Rachana Packer Rep   02/04/25 10:03 EST

## 2025-02-10 DIAGNOSIS — E11.9 DM TYPE 2, GOAL HBA1C < 7%: ICD-10-CM

## 2025-02-10 DIAGNOSIS — K21.9 CHRONIC GERD: ICD-10-CM

## 2025-02-10 RX ORDER — PANTOPRAZOLE SODIUM 40 MG/1
40 TABLET, DELAYED RELEASE ORAL DAILY
Qty: 90 TABLET | Refills: 1 | Status: SHIPPED | OUTPATIENT
Start: 2025-02-10

## 2025-02-10 RX ORDER — METFORMIN HYDROCHLORIDE 750 MG/1
750 TABLET, EXTENDED RELEASE ORAL DAILY
Qty: 90 TABLET | Refills: 1 | Status: SHIPPED | OUTPATIENT
Start: 2025-02-10

## 2025-02-11 RX ORDER — TERBINAFINE HYDROCHLORIDE 250 MG/1
250 TABLET ORAL DAILY
Qty: 84 TABLET | Refills: 0 | Status: SHIPPED | OUTPATIENT
Start: 2025-02-11

## 2025-02-11 NOTE — TELEPHONE ENCOUNTER
Caller: Sneha Cook    Relationship: Self    Best call back number: [phone]     What is the best time to reach you: ANY    Who are you requesting to speak with (clinical staff, provider,  specific staff member): CLINICAL STAFF     Do you know the name of the person who called:      What was the call regarding: PATIENT IS REQUESTING A CALL REGARDING HER FINGERNAIL FUNGUS. PATIENT STATED THAT THE FUNGUS IS SPREADING AFTER FINISHING MEDICATION. PLEASE CALL TO ADVISE.    Is it okay if the provider responds through MyChart: NO

## 2025-02-11 NOTE — TELEPHONE ENCOUNTER
Pt stated could you send medication to pharmacy in [place], has been updated, medication pended. Also stated can the fungus be contiguous and dangerous to family with her by using the same towels or anything else.

## 2025-03-13 ENCOUNTER — TELEPHONE (OUTPATIENT)
Dept: FAMILY MEDICINE CLINIC | Facility: CLINIC | Age: 80
End: 2025-03-13
Payer: MEDICARE

## 2025-04-06 NOTE — TELEPHONE ENCOUNTER
Rx Refill Note  Requested Prescriptions     Pending Prescriptions Disp Refills    terbinafine (lamiSIL) 250 MG tablet [Pharmacy Med Name: TERBINAFINE HYDROCHL 250MG TAB] 84 tablet 0     Sig: TAKE ONE TABLET BY MOUTH DAILY      Last office visit with prescribing clinician: 1/29/2025   Last telemedicine visit with prescribing clinician: Visit date not found   Next office visit with prescribing clinician: 5/29/2025                         Would you like a call back once the refill request has been completed: [] Yes [] No    If the office needs to give you a call back, can they leave a voicemail: [] Yes [] No    Yanet Trejo CMA/ELVIA  04/06/25, 18:55 EDT

## 2025-04-07 RX ORDER — TERBINAFINE HYDROCHLORIDE 250 MG/1
250 TABLET ORAL DAILY
Qty: 84 TABLET | Refills: 0 | Status: SHIPPED | OUTPATIENT
Start: 2025-04-07 | End: 2025-04-09 | Stop reason: SDUPTHER

## 2025-04-08 ENCOUNTER — TELEPHONE (OUTPATIENT)
Dept: FAMILY MEDICINE CLINIC | Facility: CLINIC | Age: 80
End: 2025-04-08
Payer: MEDICARE

## 2025-04-08 DIAGNOSIS — I10 PRIMARY HYPERTENSION: ICD-10-CM

## 2025-04-08 DIAGNOSIS — E11.9 DM TYPE 2, GOAL HBA1C < 7%: ICD-10-CM

## 2025-04-08 DIAGNOSIS — K21.9 CHRONIC GERD: ICD-10-CM

## 2025-04-08 RX ORDER — TERBINAFINE HYDROCHLORIDE 250 MG/1
250 TABLET ORAL DAILY
Qty: 84 TABLET | Refills: 0 | Status: CANCELLED | OUTPATIENT
Start: 2025-04-08

## 2025-04-08 NOTE — TELEPHONE ENCOUNTER
Rx Refill Note  Requested Prescriptions     Pending Prescriptions Disp Refills    terbinafine (lamiSIL) 250 MG tablet 84 tablet 0     Sig: Take 1 tablet by mouth Daily.    pantoprazole (PROTONIX) 40 MG EC tablet 90 tablet 1     Sig: Take 1 tablet by mouth Daily.    metFORMIN ER (GLUCOPHAGE-XR) 750 MG 24 hr tablet 90 tablet 1     Sig: Take 1 tablet by mouth Daily. Take one tablet a day.    losartan (COZAAR) 100 MG tablet 90 tablet 1     Sig: Take 1 tablet by mouth Daily.      Last office visit with prescribing clinician: 1/29/2025   Last telemedicine visit with prescribing clinician: Visit date not found   Next office visit with prescribing clinician: 5/29/2025                         Would you like a call back once the refill request has been completed: [] Yes [] No    If the office needs to give you a call back, can they leave a voicemail: [] Yes [] No    Laurita Thomson MA  04/08/25, 15:30 EDT

## 2025-04-08 NOTE — TELEPHONE ENCOUNTER
Patient is requesting four medications to be refilled please. Terbinafine, pantoprazole, metformin and losartan. Patient mentioned that she use to take cyclobenzaprine but is now taking a gummy? Is the cyclobenzaprine appropriate to fill? Please send refills to [facility].

## 2025-04-09 ENCOUNTER — TELEPHONE (OUTPATIENT)
Dept: FAMILY MEDICINE CLINIC | Facility: CLINIC | Age: 80
End: 2025-04-09

## 2025-04-09 RX ORDER — TERBINAFINE HYDROCHLORIDE 250 MG/1
250 TABLET ORAL DAILY
Qty: 84 TABLET | Refills: 0 | Status: SHIPPED | OUTPATIENT
Start: 2025-04-09

## 2025-04-09 RX ORDER — LOSARTAN POTASSIUM 100 MG/1
100 TABLET ORAL DAILY
Qty: 90 TABLET | Refills: 1 | Status: SHIPPED | OUTPATIENT
Start: 2025-04-09

## 2025-04-09 RX ORDER — METFORMIN HYDROCHLORIDE 750 MG/1
750 TABLET, EXTENDED RELEASE ORAL DAILY
Qty: 90 TABLET | Refills: 1 | Status: SHIPPED | OUTPATIENT
Start: 2025-04-09

## 2025-04-09 RX ORDER — PANTOPRAZOLE SODIUM 40 MG/1
40 TABLET, DELAYED RELEASE ORAL DAILY
Qty: 90 TABLET | Refills: 1 | Status: SHIPPED | OUTPATIENT
Start: 2025-04-09

## 2025-04-09 NOTE — TELEPHONE ENCOUNTER
Rx Refill Note  Requested Prescriptions     Pending Prescriptions Disp Refills    terbinafine (lamiSIL) 250 MG tablet 84 tablet 0     Sig: Take 1 tablet by mouth Daily.      Last office visit with prescribing clinician: 1/29/2025   Last telemedicine visit with prescribing clinician: Visit date not found   Next office visit with prescribing clinician: 5/29/2025                         Would you like a call back once the refill request has been completed: [] Yes [] No    If the office needs to give you a call back, can they leave a voicemail: [] Yes [] No    Darrin Lay CMA/LMR  04/09/25, 14:40 EDT

## 2025-04-09 NOTE — TELEPHONE ENCOUNTER
Caller: Sneha Cook    Relationship: Self    Best call back number: [phone]*    What was the call regarding: PATIENT REQUEST A COURTESY CALL WHEN MEDICATION REFILLS HAVE BEEN SENT TO [facility].

## 2025-04-09 NOTE — TELEPHONE ENCOUNTER
Caller: Sneha Cook    Relationship: Self    Best call back number: [phone]     Which medication are you concerned about: terbinafine (lamiSIL) 250 MG tablet     Who prescribed you this medication: DR GUAMAN    What are your concerns: PATIENT IS REQUESTING TO KNOW IF REYNALDO ROWENA WANTS HER TO CONTINUE TAKING THIS MEDICATION.    PATIENT STATED HER PHARMACY HAS FILLED THIS PRESCRIPTION, HOWEVER SHE HAS NOT PICKED UP THIS PRESCRIPTION AS SHE WOULD LIKE TO CONFIRM IF SHE SHOULD TAKE THIS OR NOT FIRST.    PLEASE CONTACT PATIENT TO ADVISE

## 2025-04-09 NOTE — TELEPHONE ENCOUNTER
Caller: Sneha Cook    Relationship: Self    Best call back number: [phone]*    What was the call regarding: PATIENT NEEDING TO KNOW IF DR. GUAMAN WANTS HER TO CONTINUE TAKING THE terbinafine (lamiSIL) 250 MG tablet , SINCE  A REFILL HAS NOT BEEN SENT TO [facility]. PATIENT REQUEST A CALL BACK TO ADVISE.

## 2025-04-29 DIAGNOSIS — E03.9 HYPOTHYROIDISM, UNSPECIFIED TYPE: ICD-10-CM

## 2025-04-29 RX ORDER — LEVOTHYROXINE SODIUM 50 UG/1
50 TABLET ORAL DAILY
Qty: 90 TABLET | Refills: 1 | Status: SHIPPED | OUTPATIENT
Start: 2025-04-29

## 2025-05-01 ENCOUNTER — TELEPHONE (OUTPATIENT)
Dept: FAMILY MEDICINE CLINIC | Facility: CLINIC | Age: 80
End: 2025-05-01
Payer: MEDICARE

## 2025-05-05 NOTE — TELEPHONE ENCOUNTER
"Pt called requesting a written script for a \"Zero gravity recliner that is medical necessary for her back issues. ICD#-10 (she stated that is the diagnosis code) She states that if she gets the script that she can get out of paying taxes on the chair    Please advise   "
Done   
Per Yanet pt picked up script 5/2  
No

## 2025-05-29 ENCOUNTER — OFFICE VISIT (OUTPATIENT)
Dept: FAMILY MEDICINE CLINIC | Facility: CLINIC | Age: 80
End: 2025-05-29
Payer: MEDICARE

## 2025-05-29 VITALS
HEIGHT: 65 IN | WEIGHT: 168 LBS | HEART RATE: 94 BPM | BODY MASS INDEX: 27.99 KG/M2 | SYSTOLIC BLOOD PRESSURE: 124 MMHG | RESPIRATION RATE: 16 BRPM | TEMPERATURE: 97.7 F | OXYGEN SATURATION: 96 % | DIASTOLIC BLOOD PRESSURE: 78 MMHG

## 2025-05-29 DIAGNOSIS — I10 PRIMARY HYPERTENSION: ICD-10-CM

## 2025-05-29 DIAGNOSIS — K21.9 CHRONIC GERD: ICD-10-CM

## 2025-05-29 DIAGNOSIS — E03.9 ACQUIRED HYPOTHYROIDISM: ICD-10-CM

## 2025-05-29 DIAGNOSIS — M48.062 SPINAL STENOSIS OF LUMBAR REGION WITH NEUROGENIC CLAUDICATION: ICD-10-CM

## 2025-05-29 DIAGNOSIS — E78.5 DYSLIPIDEMIA: ICD-10-CM

## 2025-05-29 DIAGNOSIS — G89.29 CHRONIC RADICULAR PAIN OF LOWER BACK: ICD-10-CM

## 2025-05-29 DIAGNOSIS — M54.16 CHRONIC RADICULAR PAIN OF LOWER BACK: ICD-10-CM

## 2025-05-29 DIAGNOSIS — M51.369 DDD (DEGENERATIVE DISC DISEASE), LUMBAR: ICD-10-CM

## 2025-05-29 DIAGNOSIS — E11.9 DM TYPE 2, GOAL HBA1C < 7%: Primary | ICD-10-CM

## 2025-05-29 LAB
EXPIRATION DATE: ABNORMAL
HBA1C MFR BLD: 7.3 % (ref 4.5–5.7)
Lab: ABNORMAL

## 2025-05-29 RX ORDER — LOSARTAN POTASSIUM 100 MG/1
100 TABLET ORAL DAILY
Qty: 90 TABLET | Refills: 1 | Status: SHIPPED | OUTPATIENT
Start: 2025-05-29

## 2025-05-29 RX ORDER — CYCLOBENZAPRINE HCL 10 MG
10 TABLET ORAL 2 TIMES DAILY PRN
Qty: 60 TABLET | Refills: 3 | Status: CANCELLED | OUTPATIENT
Start: 2025-05-29

## 2025-05-29 RX ORDER — MULTIVITAMIN WITH IRON
50 TABLET ORAL DAILY
COMMUNITY
Start: 2024-09-24

## 2025-05-29 NOTE — PROGRESS NOTES
Subjective     Sneha Cook is a 79 y.o. female.     Chief Complaint   Patient presents with    DM type 2, goal HbA1c < 7%     4 month f/u     Acquired hypothyroidism    Primary hypertension      Dyslipidemia    Spinal stenosis of lumbar region with neurogenic claudicati     Discuss medication     Hand Problem     Right hand less strength        History of Present Illness     Presents for FU On the followings;     DM type 2 with PN;   Eats HD with no carbs and sugar . Eats daily fruits   On MTF BID  Walks daily 2-4 miles /day   Today A1c 7.3 same as before.    HTN;  no new concern, doing well with current Rx, no S/E reported. On HD    Hypothyroidism s/p thyroidectomy;  doing well on Levothyr 50 mcg. Last TSH wnl       PN; likely from her LBP, still has numbness of both feet, Following with pod.    Chronic radicular back pain with Spinal stenosis;  Doing well, not having back pain for last 2 weeks due to epidural shot.  Walks daily 2-3 miles daily, her gait is unsteady so she is using cane. Had PT did not help  She noticed that her Rt hand little weak  No BB sx  She stopped driving   Declined surgery   Follow with [facility]     HLD; doing well on statin, eats HD , no S/E reported from statin      GERD; well controlled with current Rx/PRN, no S/E reported.          Lab Results   Component Value Date    HGBA1C 7.3 (A) 05/29/2025    HGBA1C 7.3 (A) 01/29/2025    HGBA1C 6.90 (H) 10/03/2024      Lab Results   Component Value Date    CHLPL 291 (H) 01/30/2025    TRIG 168 (H) 01/30/2025    HDL 86 01/30/2025     (H) 01/30/2025     Lab Results   Component Value Date    TSH 1.020 01/30/2025       Lab Results   Component Value Date    GLUCOSE 202 (H) 01/30/2025    BUN 23 01/30/2025    CREATININE 0.97 01/30/2025     01/30/2025    K 4.4 01/30/2025    CL 97 01/30/2025    CALCIUM 9.3 01/30/2025    PROTEINTOT 6.7 01/30/2025    ALBUMIN 4.3 01/30/2025    ALT 14 01/30/2025    AST 20 01/30/2025    ALKPHOS 114 01/30/2025     BILITOT 0.4 01/30/2025    GLOB 2.4 01/30/2025    AGRATIO 2.1 10/03/2024    BCR 24 01/30/2025    EGFR 59 (L) 01/30/2025         The following portions of the patient's history were reviewed and updated as appropriate: allergies, current medications, past family history, past medical history, past social history, past surgical history, and problem list.        Review of Systems   Cardiovascular:  Negative for chest pain.       Vitals:    05/29/25 1002   BP: 124/78   Pulse: 94   Resp: 16   Temp: 97.7 °F (36.5 °C)   SpO2: 96%           05/29/25  1002   Weight: 76.2 kg (168 lb)         Body mass index is 27.96 kg/m².      Current Outpatient Medications   Medication Sig Dispense Refill    cyanocobalamin (VITAMIN B-12N) 50 MCG tablet Take 1 tablet by mouth Daily.      cyclobenzaprine (FLEXERIL) 10 MG tablet Take 1 tablet by mouth 2 (Two) Times a Day As Needed for Muscle Spasms. 60 tablet 3    diphenhydrAMINE-acetaminophen (TYLENOL PM)  MG tablet per tablet Take 1 tablet by mouth At Night As Needed.      ibuprofen (ADVIL,MOTRIN) 200 MG tablet Take 1 tablet by mouth Every 6 (Six) Hours As Needed.      levothyroxine (SYNTHROID, LEVOTHROID) 50 MCG tablet TAKE 1 TABLET BY MOUTH DAILY 90 tablet 1    losartan (COZAAR) 100 MG tablet Take 1 tablet by mouth Daily. 90 tablet 1    Magnesium 400 MG capsule Take  by mouth.      pantoprazole (PROTONIX) 40 MG EC tablet Take 1 tablet by mouth Daily. 90 tablet 1    simvastatin (ZOCOR) 40 MG tablet Take 1 tablet by mouth Every Night. 90 tablet 1    metFORMIN ER (GLUCOPHAGE-XR) 750 MG 24 hr tablet Take 1 tablet by mouth Daily. Take one tablet a day. 90 tablet 1     No current facility-administered medications for this visit.                Objective   Physical Exam  Vitals and nursing note reviewed.   Constitutional:       General: She is not in acute distress.     Appearance: She is not toxic-appearing.   Cardiovascular:      Rate and Rhythm: Regular rhythm.      Heart sounds:  Normal heart sounds. No murmur heard.  Pulmonary:      Effort: Pulmonary effort is normal. No respiratory distress.      Breath sounds: Normal breath sounds. No stridor. No wheezing, rhonchi or rales.   Neurological:      Mental Status: She is alert and oriented to person, place, and time.   Psychiatric:         Mood and Affect: Mood normal.         Behavior: Behavior normal.         Thought Content: Thought content normal.           Assessment & Plan   Diagnoses and all orders for this visit:    1. DM type 2, goal HbA1c < 7% (Primary)  Comments:  Stable, continue current Rx, discussed HD  Orders:  -     POC Glycosylated Hemoglobin (Hb A1C)    2. Chronic radicular pain of lower back  Comments:  Stable, continue current Rx    3. DDD (degenerative disc disease), lumbar  Comments:  Stable, continue current Rx    4. Spinal stenosis of lumbar region with neurogenic claudication  Comments:  Stable, continue current Rx    5. Primary hypertension  Comments:  Stable, continue current Rx  Orders:  -     losartan (COZAAR) 100 MG tablet; Take 1 tablet by mouth Daily.  Dispense: 90 tablet; Refill: 1    6. Chronic GERD  Comments:  Stable, continue current Rx    7. Dyslipidemia  Comments:  Stable, continue current Rx    8. Acquired hypothyroidism  Comments:  Stable, continue current Rx            Patient was given instructions and counseling regarding her condition or for health maintenance advice. Please see specific information pulled into the AVS if appropriate.       I have fully discussed the nature of the medical condition(s) risks, complications, management, safe and proper use of medications.   Pt stated no allergy to the above prescribed medication.  I have discussed the SIDE EFFECT OF MEDICATION and importance TO report any side effect , the patient expressed good understanding.  Encouraged medication compliance and the importance of keeping scheduled follow up appointments with me and any other providers.    Patient  instructed to follow up with our office for results on any labs/imaging ordered during this visit.    Home care discussed  All questions answered  Patient verbalizes understanding and agrees to treatment plan.     Follow up: Return in about 3 months (around 9/1/2025) for come fasting, follow up on current illness, LDL check .

## 2025-05-30 ENCOUNTER — TELEPHONE (OUTPATIENT)
Dept: FAMILY MEDICINE CLINIC | Facility: CLINIC | Age: 80
End: 2025-05-30
Payer: MEDICARE

## 2025-05-30 DIAGNOSIS — E11.9 DM TYPE 2, GOAL HBA1C < 7%: ICD-10-CM

## 2025-05-30 RX ORDER — METFORMIN HYDROCHLORIDE 750 MG/1
750 TABLET, EXTENDED RELEASE ORAL DAILY
Qty: 90 TABLET | Refills: 1 | Status: SHIPPED | OUTPATIENT
Start: 2025-05-30 | End: 2025-06-02 | Stop reason: SDUPTHER

## 2025-05-30 NOTE — TELEPHONE ENCOUNTER
Caller: Sneha Cook    Relationship to patient: Self    Best call back number: [phone]      Patient is needing: SHE IS CHECKING UP ON HER DIABETES MEDICATIONS, SHE MENTIONED PUTTING HER ON ADDITIONAL MEDICATION FOR THIS BUT DID NOT CALL ANYTHING IN.  ALSO, SHE NEEDS AN ORDER FOR HER EPIDURAL.          [id number] [address] AT [facility] [address] - [phone] [facility] [phone] FX

## 2025-05-30 NOTE — TELEPHONE ENCOUNTER
Caller: Sneha Cook    Relationship: Self    Best call back number:[phone]    Requested Prescriptions:   Requested Prescriptions     Pending Prescriptions Disp Refills    metFORMIN ER (GLUCOPHAGE-XR) 750 MG 24 hr tablet 90 tablet 1     Sig: Take 1 tablet by mouth Daily. Take one tablet a day.        Pharmacy where request should be sent: [id number] [address]  AT [facility] [address]  [phone] [facility] [phone] FX     Last office visit with prescribing clinician: 5/29/2025   Last telemedicine visit with prescribing clinician: Visit date not found   Next office visit with prescribing clinician: 8/28/2025     Additional details provided by patient: ENOUGH FOR A WEEK    Does the patient have less than a 3 day supply:  [] Yes  [x] No    Would you like a call back once the refill request has been completed: [] Yes [x] No    If the office needs to give you a call back, can they leave a voicemail: [] Yes [x] No    Rachana Packer Rep   05/30/25 10:53 EDT

## 2025-06-02 DIAGNOSIS — E11.9 DM TYPE 2, GOAL HBA1C < 7%: ICD-10-CM

## 2025-06-02 RX ORDER — METFORMIN HYDROCHLORIDE 750 MG/1
750 TABLET, EXTENDED RELEASE ORAL DAILY
Qty: 90 TABLET | Refills: 1 | Status: SHIPPED | OUTPATIENT
Start: 2025-06-02

## 2025-07-02 ENCOUNTER — TELEMEDICINE (OUTPATIENT)
Dept: FAMILY MEDICINE CLINIC | Facility: CLINIC | Age: 80
End: 2025-07-02
Payer: MEDICARE

## 2025-07-02 VITALS — WEIGHT: 166 LBS | BODY MASS INDEX: 27.66 KG/M2 | HEIGHT: 65 IN

## 2025-07-02 DIAGNOSIS — M48.062 SPINAL STENOSIS OF LUMBAR REGION WITH NEUROGENIC CLAUDICATION: ICD-10-CM

## 2025-07-02 DIAGNOSIS — G89.29 CHRONIC RADICULAR PAIN OF LOWER BACK: ICD-10-CM

## 2025-07-02 DIAGNOSIS — M51.369 DDD (DEGENERATIVE DISC DISEASE), LUMBAR: ICD-10-CM

## 2025-07-02 DIAGNOSIS — M54.16 CHRONIC RADICULAR PAIN OF LOWER BACK: ICD-10-CM

## 2025-07-02 PROCEDURE — 99213 OFFICE O/P EST LOW 20 MIN: CPT | Performed by: NURSE PRACTITIONER

## 2025-07-02 PROCEDURE — 1126F AMNT PAIN NOTED NONE PRSNT: CPT | Performed by: NURSE PRACTITIONER

## 2025-07-02 PROCEDURE — 1160F RVW MEDS BY RX/DR IN RCRD: CPT | Performed by: NURSE PRACTITIONER

## 2025-07-02 PROCEDURE — 1159F MED LIST DOCD IN RCRD: CPT | Performed by: NURSE PRACTITIONER

## 2025-07-02 RX ORDER — MELOXICAM 7.5 MG/1
7.5 TABLET ORAL DAILY PRN
Qty: 15 TABLET | Refills: 0 | Status: SHIPPED | OUTPATIENT
Start: 2025-07-02

## 2025-07-02 NOTE — PROGRESS NOTES
"Chief Complaint  Muscle Pain (Sunday pm episode of severe lower leg cramping after overextending herself that day. Pt denies current pain, requesting muscle relaxer )    Subjective        Sneha Cook presents to [facility]  You have chosen to receive care through a telehealth visit.  Do you consent to use a video/audio connection for your medical care today? Yes     History of Present Illness  Sneha Cook is a 80 y.o. female who presents for a video visit with concerns of muscle pain. She has arthritis in her back, and she tries to workout 30 minutes at a time, then rest. However, this weekend, she did too much baking. Her feet and toes curled and pain extended up to shins. She denies back pain. She follows with pain management and injections are  helping with her pain. No incontinence. She is not taking anything for pain. Her pain is periodic. She does walk daily. She is staying hydrated, about 24 oz of water per day.  She has also had some nodules pop up on her hands. Follow-up if symptoms not improving.     Objective   Vital Signs:  Ht 165.1 cm (65\")   Wt 75.3 kg (166 lb) Comment: telehealth - pt reported  BMI 27.62 kg/m²   Estimated body mass index is 27.62 kg/m² as calculated from the following:    Height as of this encounter: 165.1 cm (65\").    Weight as of this encounter: 75.3 kg (166 lb).            Physical Exam  Vitals reviewed.   Constitutional:       General: She is not in acute distress.     Appearance: Normal appearance. She is not ill-appearing, toxic-appearing or diaphoretic.   HENT:      Head: Normocephalic and atraumatic.   Pulmonary:      Effort: Pulmonary effort is normal. No respiratory distress.   Neurological:      Mental Status: She is alert.        Result Review :                Assessment and Plan   Diagnoses and all orders for this visit:    1. Chronic radicular pain of lower back  -     meloxicam (MOBIC) 7.5 MG tablet; Take 1 tablet by " mouth Daily As Needed for Mild Pain or Moderate Pain.  Dispense: 15 tablet; Refill: 0    2. DDD (degenerative disc disease), lumbar  -     meloxicam (MOBIC) 7.5 MG tablet; Take 1 tablet by mouth Daily As Needed for Mild Pain or Moderate Pain.  Dispense: 15 tablet; Refill: 0    3. Spinal stenosis of lumbar region with neurogenic claudication  -     meloxicam (MOBIC) 7.5 MG tablet; Take 1 tablet by mouth Daily As Needed for Mild Pain or Moderate Pain.  Dispense: 15 tablet; Refill: 0      Recommended rest, increased hydration, and continuing flexeril as needed. She has tolerated meloxicam well in the past. Reccommended taking with food. Recommended only temporary use of meloxicam because of impact on kidney function.          Follow Up   Return if symptoms worsen or fail to improve.  Patient was given instructions and counseling regarding her condition or for health maintenance advice. Please see specific information pulled into the AVS if appropriate.     Mode of Visit: Video  Location of patient: -HOME-  Location of provider: +[facility]+  You have chosen to receive care through a telehealth visit.  The patient has signed the video visit consent form.  The visit included audio and video interaction. No technical issues occurred during this visit.        Electronically signed by LORRIE Akins, 07/05/25, 3:10 PM EDT.

## 2025-07-15 DIAGNOSIS — M54.16 CHRONIC RADICULAR PAIN OF LOWER BACK: ICD-10-CM

## 2025-07-15 DIAGNOSIS — M51.369 DDD (DEGENERATIVE DISC DISEASE), LUMBAR: ICD-10-CM

## 2025-07-15 DIAGNOSIS — G89.29 CHRONIC RADICULAR PAIN OF LOWER BACK: ICD-10-CM

## 2025-07-15 DIAGNOSIS — M48.062 SPINAL STENOSIS OF LUMBAR REGION WITH NEUROGENIC CLAUDICATION: ICD-10-CM

## 2025-07-15 RX ORDER — MELOXICAM 7.5 MG/1
7.5 TABLET ORAL DAILY PRN
Qty: 30 TABLET | Refills: 2 | Status: SHIPPED | OUTPATIENT
Start: 2025-07-15

## 2025-07-15 NOTE — TELEPHONE ENCOUNTER
Rx Refill Note  Requested Prescriptions     Pending Prescriptions Disp Refills    meloxicam (MOBIC) 7.5 MG tablet [Pharmacy Med Name: MELOXICAM 7.5MG TABLETS] 15 tablet 0     Sig: TAKE 1 TABLET BY MOUTH DAILY AS NEEDED FOR MILD PAIN OR MODERATE PAIN      Last office visit with prescribing clinician: Visit date not found   Last telemedicine visit with prescribing clinician: 7/2/2025   Next office visit with prescribing clinician: Visit date not found                         Would you like a call back once the refill request has been completed: [] Yes [] No    If the office needs to give you a call back, can they leave a voicemail: [] Yes [] No    Darrin Lay CMA/LMR  07/15/25, 10:24 EDT

## 2025-07-17 ENCOUNTER — TELEPHONE (OUTPATIENT)
Dept: FAMILY MEDICINE CLINIC | Facility: CLINIC | Age: 80
End: 2025-07-17
Payer: MEDICARE

## 2025-07-17 DIAGNOSIS — E11.9 DM TYPE 2, GOAL HBA1C < 7%: Primary | ICD-10-CM

## 2025-07-17 DIAGNOSIS — E11.42 DM TYPE 2 WITH DIABETIC PERIPHERAL NEUROPATHY: ICD-10-CM

## 2025-07-17 RX ORDER — ACYCLOVIR 400 MG/1
1 TABLET ORAL CONTINUOUS
Qty: 3 EACH | Refills: 11 | Status: SHIPPED | OUTPATIENT
Start: 2025-07-17

## 2025-07-17 RX ORDER — ACYCLOVIR 400 MG/1
1 TABLET ORAL CONTINUOUS
Qty: 1 EACH | Refills: 4 | Status: SHIPPED | OUTPATIENT
Start: 2025-07-17

## 2025-07-17 NOTE — TELEPHONE ENCOUNTER
Pt called requesting a Dexcom senor be called into [facility] [address]  because she her A1C was borderline and she has been working hard to fix that and pt thinks the sensor will help her    Please advise

## 2025-08-21 ENCOUNTER — OFFICE VISIT (OUTPATIENT)
Dept: FAMILY MEDICINE CLINIC | Facility: CLINIC | Age: 80
End: 2025-08-21
Payer: MEDICARE

## 2025-08-21 VITALS
HEIGHT: 65 IN | BODY MASS INDEX: 27.32 KG/M2 | SYSTOLIC BLOOD PRESSURE: 118 MMHG | TEMPERATURE: 98.6 F | WEIGHT: 164 LBS | OXYGEN SATURATION: 98 % | RESPIRATION RATE: 16 BRPM | HEART RATE: 87 BPM | DIASTOLIC BLOOD PRESSURE: 70 MMHG

## 2025-08-21 DIAGNOSIS — G89.29 CHRONIC RADICULAR PAIN OF LOWER BACK: ICD-10-CM

## 2025-08-21 DIAGNOSIS — E11.9 DM TYPE 2, GOAL HBA1C < 7%: Primary | ICD-10-CM

## 2025-08-21 DIAGNOSIS — I10 PRIMARY HYPERTENSION: ICD-10-CM

## 2025-08-21 DIAGNOSIS — E03.9 ACQUIRED HYPOTHYROIDISM: ICD-10-CM

## 2025-08-21 DIAGNOSIS — E78.5 DYSLIPIDEMIA: ICD-10-CM

## 2025-08-21 DIAGNOSIS — Z78.0 MENOPAUSE: ICD-10-CM

## 2025-08-21 DIAGNOSIS — M54.16 CHRONIC RADICULAR PAIN OF LOWER BACK: ICD-10-CM

## 2025-08-21 DIAGNOSIS — K21.9 CHRONIC GERD: ICD-10-CM

## 2025-08-21 LAB
CHOLEST SERPL-MCNC: 219 MG/DL (ref 0–200)
HDLC SERPL-MCNC: 91 MG/DL (ref 40–60)
LDLC SERPL CALC-MCNC: 109 MG/DL (ref 0–100)
TRIGL SERPL-MCNC: 112 MG/DL (ref 0–150)
VLDLC SERPL CALC-MCNC: 19 MG/DL (ref 5–40)